# Patient Record
Sex: FEMALE | Race: WHITE | URBAN - METROPOLITAN AREA
[De-identification: names, ages, dates, MRNs, and addresses within clinical notes are randomized per-mention and may not be internally consistent; named-entity substitution may affect disease eponyms.]

---

## 2020-08-10 ENCOUNTER — LAB OUTSIDE AN ENCOUNTER (OUTPATIENT)
Dept: URBAN - METROPOLITAN AREA CLINIC 74 | Facility: CLINIC | Age: 60
End: 2020-08-10

## 2020-08-10 ENCOUNTER — OFFICE VISIT (OUTPATIENT)
Dept: URBAN - METROPOLITAN AREA CLINIC 74 | Facility: CLINIC | Age: 60
End: 2020-08-10
Payer: MEDICARE

## 2020-08-10 DIAGNOSIS — K59.09 COLONIC CONSTIPATION: ICD-10-CM

## 2020-08-10 DIAGNOSIS — Z98.890 HISTORY OF NISSEN FUNDOPLICATION: ICD-10-CM

## 2020-08-10 DIAGNOSIS — K21.9 GASTROESOPHAGEAL REFLUX DISEASE WITHOUT ESOPHAGITIS: ICD-10-CM

## 2020-08-10 DIAGNOSIS — K22.4 ESOPHAGEAL DYSMOTILITY: ICD-10-CM

## 2020-08-10 PROCEDURE — 1036F TOBACCO NON-USER: CPT | Performed by: INTERNAL MEDICINE

## 2020-08-10 PROCEDURE — G8419 CALC BMI OUT NRM PARAM NOF/U: HCPCS | Performed by: INTERNAL MEDICINE

## 2020-08-10 PROCEDURE — 3017F COLORECTAL CA SCREEN DOC REV: CPT | Performed by: INTERNAL MEDICINE

## 2020-08-10 PROCEDURE — 99213 OFFICE O/P EST LOW 20 MIN: CPT | Performed by: INTERNAL MEDICINE

## 2020-08-10 PROCEDURE — G9903 PT SCRN TBCO ID AS NON USER: HCPCS | Performed by: INTERNAL MEDICINE

## 2020-08-10 PROCEDURE — G8427 DOCREV CUR MEDS BY ELIG CLIN: HCPCS | Performed by: INTERNAL MEDICINE

## 2020-08-10 RX ORDER — OMEPRAZOLE 40 MG/1
TAKE 1 CAPSULE BY MOUTH TWICE DAILY BEFORE A MEAL CAPSULE, DELAYED RELEASE PELLETS ORAL
OUTPATIENT
Start: 1900-01-01

## 2020-08-10 RX ORDER — HYDROCHLOROTHIAZIDE 25 MG/1
TABLET ORAL
Qty: 0 | Refills: 0 | Status: ACTIVE | COMMUNITY
Start: 1900-01-01

## 2020-08-10 RX ORDER — SERTRALINE 50 MG/1
TABLET, FILM COATED ORAL
Qty: 0 | Refills: 0 | Status: ACTIVE | COMMUNITY
Start: 2018-01-04

## 2020-08-10 RX ORDER — CYCLOBENZAPRINE HYDROCHLORIDE 10 MG/1
TABLET, FILM COATED ORAL
Qty: 0 | Refills: 0 | Status: ACTIVE | COMMUNITY
Start: 1900-01-01

## 2020-08-10 RX ORDER — ROSUVASTATIN CALCIUM 10 MG/1
TAKE 1 TABLET (10 MG) BY ORAL ROUTE ONCE DAILY AT BEDTIME TABLET, FILM COATED ORAL 1
Qty: 0 | Refills: 0 | Status: ACTIVE | COMMUNITY
Start: 1900-01-01

## 2020-08-10 RX ORDER — FLUTICASONE PROPIONATE 50 UG/1
SPRAY, METERED NASAL
Qty: 0 | Refills: 0 | Status: ACTIVE | COMMUNITY
Start: 2018-01-03

## 2020-08-10 RX ORDER — ESTRADIOL 0.5 MG/1
TAKE 1 TABLET (0.5 MG) BY ORAL ROUTE ONCE DAILY TABLET ORAL 1
Qty: 0 | Refills: 0 | Status: ACTIVE | COMMUNITY
Start: 1900-01-01

## 2020-08-10 RX ORDER — ICOSAPENT ETHYL 1000 MG/1
TAKE 2 CAPSULES (2 GRAM) BY ORAL ROUTE 2 TIMES PER DAY WITH FOOD SWALLOWING WHOLE. DO NOT CHEW, OPEN, DISSOLVE AND/OR CRUSH CAPSULE ORAL 2
Qty: 0 | Refills: 0 | Status: ACTIVE | COMMUNITY
Start: 1900-01-01

## 2020-08-10 RX ORDER — GABAPENTIN 800 MG/1
TAKE 1 TABLET (800 MG) BY ORAL ROUTE 3 TIMES PER DAY TABLET, FILM COATED ORAL
Qty: 0 | Refills: 0 | Status: ACTIVE | COMMUNITY
Start: 1900-01-01

## 2020-08-10 RX ORDER — HYDROXYZINE PAMOATE 25 MG/1
CAPSULE ORAL
Qty: 0 | Refills: 0 | Status: ACTIVE | COMMUNITY
Start: 2018-01-04

## 2020-08-10 RX ORDER — CARVEDILOL 6.25 MG/1
TABLET, FILM COATED ORAL
Qty: 0 | Refills: 0 | Status: ACTIVE | COMMUNITY
Start: 1900-01-01

## 2020-08-10 RX ORDER — OMEPRAZOLE 40 MG/1
TAKE 1 CAPSULE BY MOUTH TWICE DAILY BEFORE A MEAL CAPSULE, DELAYED RELEASE PELLETS ORAL
Qty: 180 | Refills: 0 | Status: ACTIVE | COMMUNITY
Start: 1900-01-01

## 2020-08-10 RX ORDER — ALPRAZOLAM 0.5 MG/1
TABLET ORAL
Qty: 0 | Refills: 0 | Status: ACTIVE | COMMUNITY
Start: 1900-01-01

## 2020-08-10 NOTE — HPI-TODAY'S VISIT:
Today August 10, 2020 the patient returns for a follow-up visit, the patient was last seen in the office May 9, 2019 with a history of gastroesophageal reflux without esophagitis, history of Nissen fundoplication, recurrent heartburn, history of a hiatal hernia, history of esophageal dysmotility, history of chronic gastritis, asthma, pulmonary emphysema. The patient had an EGD in 2018 that showed normal esophageal mucosa, a Nissen fundoplication, chronic gastritis.  The patient had a colonoscopy around the same time which showed a 3 mm lymphoid aggregate in the sigmoid colon and internal hemorrhoids.  The patient was advised to have a surveillance colonoscopy in 10 years. The patient has been treated with omeprazole 40 mg twice daily and appeared to be doing well.  The patient was advised to be seen by the nutritionist. Today the patient returns complaining off having significant allergies, allergies are triggering cough and cough is treating her reflux with severe heartburn.  The patient also complains of dysphagia to both liquids and solids.  She denied any chest pain, abdominal pain.  She is taking Prilosec 40 mg twice daily for the heartburn.  The patient agreed to have a barium swallow with liquid barium and a barium tablet as well as an EGD.  Benefits potential complications and alternatives were disclosed.  The patient should follow antireflux measures and diet.

## 2020-08-31 ENCOUNTER — OFFICE VISIT (OUTPATIENT)
Dept: URBAN - METROPOLITAN AREA SURGERY CENTER 30 | Facility: SURGERY CENTER | Age: 60
End: 2020-08-31
Payer: MEDICARE

## 2020-08-31 DIAGNOSIS — R13.19 CERVICAL DYSPHAGIA: ICD-10-CM

## 2020-08-31 DIAGNOSIS — K31.89 ACQUIRED DEFORMITY OF DUODENUM: ICD-10-CM

## 2020-08-31 DIAGNOSIS — K22.8 COLUMNAR-LINED ESOPHAGUS: ICD-10-CM

## 2020-08-31 DIAGNOSIS — R12 BURNING REFLUX: ICD-10-CM

## 2020-08-31 PROCEDURE — G8907 PT DOC NO EVENTS ON DISCHARG: HCPCS | Performed by: INTERNAL MEDICINE

## 2020-08-31 PROCEDURE — 43239 EGD BIOPSY SINGLE/MULTIPLE: CPT | Performed by: INTERNAL MEDICINE

## 2020-08-31 RX ORDER — HYDROCHLOROTHIAZIDE 25 MG/1
TABLET ORAL
Qty: 0 | Refills: 0 | Status: ACTIVE | COMMUNITY
Start: 1900-01-01

## 2020-08-31 RX ORDER — ALPRAZOLAM 0.5 MG/1
TABLET ORAL
Qty: 0 | Refills: 0 | Status: ACTIVE | COMMUNITY
Start: 1900-01-01

## 2020-08-31 RX ORDER — ROSUVASTATIN CALCIUM 10 MG/1
TAKE 1 TABLET (10 MG) BY ORAL ROUTE ONCE DAILY AT BEDTIME TABLET, FILM COATED ORAL 1
Qty: 0 | Refills: 0 | Status: ACTIVE | COMMUNITY
Start: 1900-01-01

## 2020-08-31 RX ORDER — SERTRALINE 50 MG/1
TABLET, FILM COATED ORAL
Qty: 0 | Refills: 0 | Status: ACTIVE | COMMUNITY
Start: 2018-01-04

## 2020-08-31 RX ORDER — GABAPENTIN 800 MG/1
TAKE 1 TABLET (800 MG) BY ORAL ROUTE 3 TIMES PER DAY TABLET, FILM COATED ORAL
Qty: 0 | Refills: 0 | Status: ACTIVE | COMMUNITY
Start: 1900-01-01

## 2020-08-31 RX ORDER — ESTRADIOL 0.5 MG/1
TAKE 1 TABLET (0.5 MG) BY ORAL ROUTE ONCE DAILY TABLET ORAL 1
Qty: 0 | Refills: 0 | Status: ACTIVE | COMMUNITY
Start: 1900-01-01

## 2020-08-31 RX ORDER — FLUTICASONE PROPIONATE 50 UG/1
SPRAY, METERED NASAL
Qty: 0 | Refills: 0 | Status: ACTIVE | COMMUNITY
Start: 2018-01-03

## 2020-08-31 RX ORDER — OMEPRAZOLE 40 MG/1
TAKE 1 CAPSULE BY MOUTH TWICE DAILY BEFORE A MEAL CAPSULE, DELAYED RELEASE PELLETS ORAL
Status: ACTIVE | COMMUNITY
Start: 1900-01-01

## 2020-08-31 RX ORDER — CARVEDILOL 6.25 MG/1
TABLET, FILM COATED ORAL
Qty: 0 | Refills: 0 | Status: ACTIVE | COMMUNITY
Start: 1900-01-01

## 2020-08-31 RX ORDER — HYDROXYZINE PAMOATE 25 MG/1
CAPSULE ORAL
Qty: 0 | Refills: 0 | Status: ACTIVE | COMMUNITY
Start: 2018-01-04

## 2020-08-31 RX ORDER — ICOSAPENT ETHYL 1000 MG/1
TAKE 2 CAPSULES (2 GRAM) BY ORAL ROUTE 2 TIMES PER DAY WITH FOOD SWALLOWING WHOLE. DO NOT CHEW, OPEN, DISSOLVE AND/OR CRUSH CAPSULE ORAL 2
Qty: 0 | Refills: 0 | Status: ACTIVE | COMMUNITY
Start: 1900-01-01

## 2020-08-31 RX ORDER — CYCLOBENZAPRINE HYDROCHLORIDE 10 MG/1
TABLET, FILM COATED ORAL
Qty: 0 | Refills: 0 | Status: ACTIVE | COMMUNITY
Start: 1900-01-01

## 2020-09-17 ENCOUNTER — OFFICE VISIT (OUTPATIENT)
Dept: URBAN - METROPOLITAN AREA CLINIC 74 | Facility: CLINIC | Age: 60
End: 2020-09-17

## 2020-09-17 RX ORDER — ESTRADIOL 0.5 MG/1
TAKE 1 TABLET (0.5 MG) BY ORAL ROUTE ONCE DAILY TABLET ORAL 1
Qty: 0 | Refills: 0 | Status: ACTIVE | COMMUNITY
Start: 1900-01-01

## 2020-09-17 RX ORDER — HYDROXYZINE PAMOATE 25 MG/1
CAPSULE ORAL
Qty: 0 | Refills: 0 | Status: ACTIVE | COMMUNITY
Start: 2018-01-04

## 2020-09-17 RX ORDER — FLUTICASONE PROPIONATE 50 UG/1
SPRAY, METERED NASAL
Qty: 0 | Refills: 0 | Status: ACTIVE | COMMUNITY
Start: 2018-01-03

## 2020-09-17 RX ORDER — OMEPRAZOLE 40 MG/1
TAKE 1 CAPSULE BY MOUTH TWICE DAILY BEFORE A MEAL CAPSULE, DELAYED RELEASE PELLETS ORAL
Status: ACTIVE | COMMUNITY
Start: 1900-01-01

## 2020-09-17 RX ORDER — ALPRAZOLAM 0.5 MG/1
TABLET ORAL
Qty: 0 | Refills: 0 | Status: ACTIVE | COMMUNITY
Start: 1900-01-01

## 2020-09-17 RX ORDER — HYDROCHLOROTHIAZIDE 25 MG/1
TABLET ORAL
Qty: 0 | Refills: 0 | Status: ACTIVE | COMMUNITY
Start: 1900-01-01

## 2020-09-17 RX ORDER — SERTRALINE 50 MG/1
TABLET, FILM COATED ORAL
Qty: 0 | Refills: 0 | Status: ACTIVE | COMMUNITY
Start: 2018-01-04

## 2020-09-17 RX ORDER — CYCLOBENZAPRINE HYDROCHLORIDE 10 MG/1
TABLET, FILM COATED ORAL
Qty: 0 | Refills: 0 | Status: ACTIVE | COMMUNITY
Start: 1900-01-01

## 2020-09-17 RX ORDER — ROSUVASTATIN CALCIUM 10 MG/1
TAKE 1 TABLET (10 MG) BY ORAL ROUTE ONCE DAILY AT BEDTIME TABLET, FILM COATED ORAL 1
Qty: 0 | Refills: 0 | Status: ACTIVE | COMMUNITY
Start: 1900-01-01

## 2020-09-17 RX ORDER — ICOSAPENT ETHYL 1000 MG/1
TAKE 2 CAPSULES (2 GRAM) BY ORAL ROUTE 2 TIMES PER DAY WITH FOOD SWALLOWING WHOLE. DO NOT CHEW, OPEN, DISSOLVE AND/OR CRUSH CAPSULE ORAL 2
Qty: 0 | Refills: 0 | Status: ACTIVE | COMMUNITY
Start: 1900-01-01

## 2020-09-17 RX ORDER — GABAPENTIN 800 MG/1
TAKE 1 TABLET (800 MG) BY ORAL ROUTE 3 TIMES PER DAY TABLET, FILM COATED ORAL
Qty: 0 | Refills: 0 | Status: ACTIVE | COMMUNITY
Start: 1900-01-01

## 2020-09-17 RX ORDER — CARVEDILOL 6.25 MG/1
TABLET, FILM COATED ORAL
Qty: 0 | Refills: 0 | Status: ACTIVE | COMMUNITY
Start: 1900-01-01

## 2020-10-08 ENCOUNTER — OFFICE VISIT (OUTPATIENT)
Dept: URBAN - METROPOLITAN AREA CLINIC 74 | Facility: CLINIC | Age: 60
End: 2020-10-08
Payer: MEDICARE

## 2020-10-08 ENCOUNTER — LAB OUTSIDE AN ENCOUNTER (OUTPATIENT)
Dept: URBAN - METROPOLITAN AREA CLINIC 74 | Facility: CLINIC | Age: 60
End: 2020-10-08

## 2020-10-08 ENCOUNTER — TELEPHONE ENCOUNTER (OUTPATIENT)
Dept: URBAN - METROPOLITAN AREA CLINIC 74 | Facility: CLINIC | Age: 60
End: 2020-10-08

## 2020-10-08 DIAGNOSIS — Z98.890 HISTORY OF NISSEN FUNDOPLICATION: ICD-10-CM

## 2020-10-08 DIAGNOSIS — K22.4 ESOPHAGEAL DYSMOTILITY: ICD-10-CM

## 2020-10-08 DIAGNOSIS — K44.9 HIATAL HERNIA: ICD-10-CM

## 2020-10-08 DIAGNOSIS — Z87.19 HISTORY OF CHRONIC GASTRITIS: ICD-10-CM

## 2020-10-08 DIAGNOSIS — J43.9 PULMONARY EMPHYSEMA, UNSPECIFIED EMPHYSEMA TYPE: ICD-10-CM

## 2020-10-08 DIAGNOSIS — J45.20 INTERMITTENT ASTHMA WITHOUT COMPLICATION, UNSPECIFIED ASTHMA SEVERITY: ICD-10-CM

## 2020-10-08 DIAGNOSIS — K21.00 CHRONIC REFLUX ESOPHAGITIS: ICD-10-CM

## 2020-10-08 DIAGNOSIS — E66.9 OBESITY: ICD-10-CM

## 2020-10-08 DIAGNOSIS — K59.09 COLONIC CONSTIPATION: ICD-10-CM

## 2020-10-08 DIAGNOSIS — R12 HEARTBURN: ICD-10-CM

## 2020-10-08 PROCEDURE — 99213 OFFICE O/P EST LOW 20 MIN: CPT | Performed by: INTERNAL MEDICINE

## 2020-10-08 PROCEDURE — G9908 NO PT TBCO CESS INTERV RNG: HCPCS | Performed by: INTERNAL MEDICINE

## 2020-10-08 PROCEDURE — 3017F COLORECTAL CA SCREEN DOC REV: CPT | Performed by: INTERNAL MEDICINE

## 2020-10-08 PROCEDURE — G8482 FLU IMMUNIZE ORDER/ADMIN: HCPCS | Performed by: INTERNAL MEDICINE

## 2020-10-08 PROCEDURE — G8419 CALC BMI OUT NRM PARAM NOF/U: HCPCS | Performed by: INTERNAL MEDICINE

## 2020-10-08 PROCEDURE — G8427 DOCREV CUR MEDS BY ELIG CLIN: HCPCS | Performed by: INTERNAL MEDICINE

## 2020-10-08 RX ORDER — ICOSAPENT ETHYL 1000 MG/1
TAKE 2 CAPSULES (2 GRAM) BY ORAL ROUTE 2 TIMES PER DAY WITH FOOD SWALLOWING WHOLE. DO NOT CHEW, OPEN, DISSOLVE AND/OR CRUSH CAPSULE ORAL 2
Qty: 0 | Refills: 0 | Status: DISCONTINUED | COMMUNITY
Start: 1900-01-01

## 2020-10-08 RX ORDER — OMEPRAZOLE 40 MG/1
TAKE 1 CAPSULE BY MOUTH TWICE DAILY BEFORE A MEAL CAPSULE, DELAYED RELEASE PELLETS ORAL
OUTPATIENT
Start: 1900-01-01

## 2020-10-08 RX ORDER — ESTRADIOL 0.5 MG/1
TAKE 1 TABLET (0.5 MG) BY ORAL ROUTE ONCE DAILY TABLET ORAL 1
Qty: 0 | Refills: 0 | Status: ACTIVE | COMMUNITY
Start: 1900-01-01

## 2020-10-08 RX ORDER — CYCLOBENZAPRINE HYDROCHLORIDE 10 MG/1
TABLET, FILM COATED ORAL
Qty: 0 | Refills: 0 | Status: ACTIVE | COMMUNITY
Start: 1900-01-01

## 2020-10-08 RX ORDER — ALPRAZOLAM 0.5 MG/1
TABLET ORAL
Qty: 0 | Refills: 0 | Status: ACTIVE | COMMUNITY
Start: 1900-01-01

## 2020-10-08 RX ORDER — HYDROXYZINE PAMOATE 25 MG/1
CAPSULE ORAL
Qty: 0 | Refills: 0 | Status: ACTIVE | COMMUNITY
Start: 2018-01-04

## 2020-10-08 RX ORDER — SERTRALINE 50 MG/1
TABLET, FILM COATED ORAL
Qty: 0 | Refills: 0 | Status: ACTIVE | COMMUNITY
Start: 2018-01-04

## 2020-10-08 RX ORDER — FLUTICASONE PROPIONATE 50 UG/1
SPRAY, METERED NASAL
Qty: 0 | Refills: 0 | Status: ACTIVE | COMMUNITY
Start: 2018-01-03

## 2020-10-08 RX ORDER — CARVEDILOL 6.25 MG/1
TABLET, FILM COATED ORAL
Qty: 0 | Refills: 0 | Status: ACTIVE | COMMUNITY
Start: 1900-01-01

## 2020-10-08 RX ORDER — ROSUVASTATIN CALCIUM 10 MG/1
TAKE 1 TABLET (10 MG) BY ORAL ROUTE ONCE DAILY AT BEDTIME TABLET, FILM COATED ORAL 1
Qty: 0 | Refills: 0 | Status: ACTIVE | COMMUNITY
Start: 1900-01-01

## 2020-10-08 RX ORDER — GABAPENTIN 800 MG/1
TAKE 1 TABLET (800 MG) BY ORAL ROUTE 3 TIMES PER DAY TABLET, FILM COATED ORAL
Qty: 0 | Refills: 0 | Status: ACTIVE | COMMUNITY
Start: 1900-01-01

## 2020-10-08 RX ORDER — OMEPRAZOLE 40 MG/1
TAKE 1 CAPSULE BY MOUTH TWICE DAILY BEFORE A MEAL CAPSULE, DELAYED RELEASE PELLETS ORAL
Status: ACTIVE | COMMUNITY
Start: 1900-01-01

## 2020-10-08 RX ORDER — HYDROCHLOROTHIAZIDE 25 MG/1
TABLET ORAL
Qty: 0 | Refills: 0 | Status: ACTIVE | COMMUNITY
Start: 1900-01-01

## 2020-10-08 NOTE — PHYSICAL EXAM GASTROINTESTINAL
Abdomen , soft, tender roumd mass over the upper abdomen, nondistended , no guarding or rigidity , no masses palpable , normal bowel sounds , Liver and Spleen , no hepatomegaly present , no hepatosplenomegaly , liver nontender , spleen not palpable

## 2020-10-08 NOTE — HPI-TODAY'S VISIT:
Today August 10, 2020 the patient returns for a follow-up visit, the patient was last seen in the office May 9, 2019 with a history of gastroesophageal reflux without esophagitis, history of Nissen fundoplication, recurrent heartburn, history of a hiatal hernia, history of esophageal dysmotility, history of chronic gastritis, asthma, pulmonary emphysema. The patient had an EGD in 2018 that showed normal esophageal mucosa, a Nissen fundoplication, chronic gastritis.  The patient had a colonoscopy around the same time which showed a 3 mm lymphoid aggregate in the sigmoid colon and internal hemorrhoids.  The patient was advised to have a surveillance colonoscopy in 10 years. The patient has been treated with omeprazole 40 mg twice daily and appeared to be doing well.  The patient was advised to be seen by the nutritionist. Today the patient returns complaining off having significant allergies, allergies are triggering cough and cough is treating her reflux with severe heartburn.  The patient also complains of dysphagia to both liquids and solids.  She denied any chest pain, abdominal pain.  She is taking Prilosec 40 mg twice daily for the heartburn.  The patient agreed to have a barium swallow with liquid barium and a barium tablet as well as an EGD.  Benefits potential complications and alternatives were disclosed.  The patient should follow antireflux measures and diet. Today October 8, 2020 the patient returns for a follow-up visit, the patient was last seen in the office August 10, 2020 with gastroesophageal reflux disease with esophagitis, heartburn, history of Nissen fundoplication, history of chronic gastritis, history of hiatal hernia, esophageal dysmotility, colonic constipation, pulmonary emphysema, intermittent asthma and obesity. During the last visit the patient complained of persistent cough. , The patient claimed that the allergies were triggering cough and that her cough was triggered reflux and severe heartburn.  Patient complaint of dysphagia to both liquids and solids.  Patient denied any chest pain, abdominal pain.  The patient was taking Prilosec 40 mg twice daily for heartburn.  The patient agreed to have a barium swallow and an EGD.  A previous EGD in 2018 showed normal esophageal mucosa, Nissen fundoplication, chronic gastritis.  Around the same time the patient had a colonoscopy that showed a 3 mm lymphoid aggregate in the sigmoid colon and internal hemorrhoids. The barium swallow was performed on August 28, 2020 and it showed a moderate hiatal hernia, gastroesophageal reflux and esophageal dysmotility, the gastroesophageal reflux climbed as high as into the proximal third of the esophagus on a recumbent position, the dysmotility created stases. The EGD was performed on August 31, 2020 and it showed a medium-sized hiatal hernia, evidence of a Nissen fundoplication at the EG junction, the wrap appeared to be loose, multiple seeds were found under the wrap.  The patient was found to have distal esophagitis, a moderately tortuous distal esophagus, antral chronic gastritis H. pylori negative and a normal duodenum. Today the patient continues to complain of gastroesophageal reflux and heartburn, the patient has been advised to have an esophageal motility study and return to Dr. Tony Esquivel for reevaluation of the hernia and Nissen fundoplication. The patient has also developed around mass over the supraumbilical area with on palpation appears to be as a large lipoma or postsurgical change, the patient did have a ventral hernia repair with a mesh. The patient will return for a follow-up visit after she gets surgically evaluated and has the motility study done.

## 2020-10-19 ENCOUNTER — OFFICE VISIT (OUTPATIENT)
Dept: URBAN - METROPOLITAN AREA MEDICAL CENTER 28 | Facility: MEDICAL CENTER | Age: 60
End: 2020-10-19
Payer: MEDICARE

## 2020-10-19 DIAGNOSIS — R12 BURNING REFLUX: ICD-10-CM

## 2020-10-19 DIAGNOSIS — K21.9 ACID REFLUX: ICD-10-CM

## 2020-10-19 PROCEDURE — 91010 ESOPHAGUS MOTILITY STUDY: CPT | Performed by: INTERNAL MEDICINE

## 2020-11-05 ENCOUNTER — OFFICE VISIT (OUTPATIENT)
Dept: URBAN - METROPOLITAN AREA CLINIC 74 | Facility: CLINIC | Age: 60
End: 2020-11-05
Payer: MEDICARE

## 2020-11-05 VITALS
BODY MASS INDEX: 29.6 KG/M2 | HEIGHT: 59 IN | TEMPERATURE: 94.5 F | SYSTOLIC BLOOD PRESSURE: 140 MMHG | WEIGHT: 146.8 LBS | DIASTOLIC BLOOD PRESSURE: 82 MMHG | HEART RATE: 98 BPM

## 2020-11-05 DIAGNOSIS — K44.9 PARAESOPHAGEAL HERNIA: ICD-10-CM

## 2020-11-05 DIAGNOSIS — R12 HEARTBURN: ICD-10-CM

## 2020-11-05 DIAGNOSIS — K21.00 GASTRO-ESOPHAGEAL REFLUX DISEASE WITH ESOPHAGITIS, WITHOUT BLEEDING: ICD-10-CM

## 2020-11-05 DIAGNOSIS — R22.2 ABDOMINAL WALL MASS: ICD-10-CM

## 2020-11-05 PROBLEM — 266433003: Status: ACTIVE | Noted: 2020-09-15

## 2020-11-05 PROBLEM — 724388006: Status: ACTIVE | Noted: 2020-11-04

## 2020-11-05 PROCEDURE — 99213 OFFICE O/P EST LOW 20 MIN: CPT | Performed by: INTERNAL MEDICINE

## 2020-11-05 PROCEDURE — G8419 CALC BMI OUT NRM PARAM NOF/U: HCPCS | Performed by: INTERNAL MEDICINE

## 2020-11-05 PROCEDURE — G8482 FLU IMMUNIZE ORDER/ADMIN: HCPCS | Performed by: INTERNAL MEDICINE

## 2020-11-05 PROCEDURE — G9902 PT SCRN TBCO AND ID AS USER: HCPCS | Performed by: INTERNAL MEDICINE

## 2020-11-05 PROCEDURE — G8427 DOCREV CUR MEDS BY ELIG CLIN: HCPCS | Performed by: INTERNAL MEDICINE

## 2020-11-05 PROCEDURE — 3017F COLORECTAL CA SCREEN DOC REV: CPT | Performed by: INTERNAL MEDICINE

## 2020-11-05 RX ORDER — HYDROXYZINE PAMOATE 25 MG/1
CAPSULE ORAL
Qty: 0 | Refills: 0 | Status: ACTIVE | COMMUNITY
Start: 2018-01-04

## 2020-11-05 RX ORDER — OMEPRAZOLE 40 MG/1
TAKE 1 CAPSULE BY MOUTH TWICE DAILY BEFORE A MEAL CAPSULE, DELAYED RELEASE PELLETS ORAL
OUTPATIENT

## 2020-11-05 RX ORDER — OMEPRAZOLE 40 MG/1
TAKE 1 CAPSULE BY MOUTH TWICE DAILY BEFORE A MEAL CAPSULE, DELAYED RELEASE PELLETS ORAL
Status: ACTIVE | COMMUNITY
Start: 1900-01-01

## 2020-11-05 RX ORDER — HYDROCHLOROTHIAZIDE 25 MG/1
TABLET ORAL
Qty: 0 | Refills: 0 | Status: ACTIVE | COMMUNITY
Start: 1900-01-01

## 2020-11-05 RX ORDER — GABAPENTIN 800 MG/1
TAKE 1 TABLET (800 MG) BY ORAL ROUTE 3 TIMES PER DAY TABLET, FILM COATED ORAL
Qty: 0 | Refills: 0 | Status: ACTIVE | COMMUNITY
Start: 1900-01-01

## 2020-11-05 RX ORDER — CYCLOBENZAPRINE HYDROCHLORIDE 10 MG/1
TABLET, FILM COATED ORAL
Qty: 0 | Refills: 0 | Status: ACTIVE | COMMUNITY
Start: 1900-01-01

## 2020-11-05 RX ORDER — ALPRAZOLAM 0.5 MG/1
TABLET ORAL
Qty: 0 | Refills: 0 | Status: ACTIVE | COMMUNITY
Start: 1900-01-01

## 2020-11-05 RX ORDER — ESTRADIOL 0.5 MG/1
TAKE 1 TABLET (0.5 MG) BY ORAL ROUTE ONCE DAILY TABLET ORAL 1
Qty: 0 | Refills: 0 | Status: ACTIVE | COMMUNITY
Start: 1900-01-01

## 2020-11-05 RX ORDER — CARVEDILOL 6.25 MG/1
TABLET, FILM COATED ORAL
Qty: 0 | Refills: 0 | Status: ACTIVE | COMMUNITY
Start: 1900-01-01

## 2020-11-05 RX ORDER — ROSUVASTATIN CALCIUM 10 MG/1
TAKE 1 TABLET (10 MG) BY ORAL ROUTE ONCE DAILY AT BEDTIME TABLET, FILM COATED ORAL 1
Qty: 0 | Refills: 0 | Status: ACTIVE | COMMUNITY
Start: 1900-01-01

## 2020-11-05 RX ORDER — SERTRALINE 50 MG/1
TABLET, FILM COATED ORAL
Qty: 0 | Refills: 0 | Status: ACTIVE | COMMUNITY
Start: 2018-01-04

## 2020-11-05 RX ORDER — FLUTICASONE PROPIONATE 50 UG/1
SPRAY, METERED NASAL
Qty: 0 | Refills: 0 | Status: ACTIVE | COMMUNITY
Start: 2018-01-03

## 2020-11-05 NOTE — HPI-TODAY'S VISIT:
Today August 10, 2020 the patient returns for a follow-up visit, the patient was last seen in the office May 9, 2019 with a history of gastroesophageal reflux without esophagitis, history of Nissen fundoplication, recurrent heartburn, history of a hiatal hernia, history of esophageal dysmotility, history of chronic gastritis, asthma, pulmonary emphysema. The patient had an EGD in 2018 that showed normal esophageal mucosa, a Nissen fundoplication, chronic gastritis.  The patient had a colonoscopy around the same time which showed a 3 mm lymphoid aggregate in the sigmoid colon and internal hemorrhoids.  The patient was advised to have a surveillance colonoscopy in 10 years. The patient has been treated with omeprazole 40 mg twice daily and appeared to be doing well.  The patient was advised to be seen by the nutritionist. Today the patient returns complaining off having significant allergies, allergies are triggering cough and cough is treating her reflux with severe heartburn.  The patient also complains of dysphagia to both liquids and solids.  She denied any chest pain, abdominal pain.  She is taking Prilosec 40 mg twice daily for the heartburn.  The patient agreed to have a barium swallow with liquid barium and a barium tablet as well as an EGD.  Benefits potential complications and alternatives were disclosed.  The patient should follow antireflux measures and diet. Today October 8, 2020 the patient returns for a follow-up visit, the patient was last seen in the office August 10, 2020 with gastroesophageal reflux disease with esophagitis, heartburn, history of Nissen fundoplication, history of chronic gastritis, history of hiatal hernia, esophageal dysmotility, colonic constipation, pulmonary emphysema, intermittent asthma and obesity. During the last visit the patient complained of persistent cough. , The patient claimed that the allergies were triggering cough and that her cough was triggered reflux and severe heartburn.  Patient complaint of dysphagia to both liquids and solids.  Patient denied any chest pain, abdominal pain.  The patient was taking Prilosec 40 mg twice daily for heartburn.  The patient agreed to have a barium swallow and an EGD.  A previous EGD in 2018 showed normal esophageal mucosa, Nissen fundoplication, chronic gastritis.  Around the same time the patient had a colonoscopy that showed a 3 mm lymphoid aggregate in the sigmoid colon and internal hemorrhoids. The barium swallow was performed on August 28, 2020 and it showed a moderate hiatal hernia, gastroesophageal reflux and esophageal dysmotility, the gastroesophageal reflux climbed as high as into the proximal third of the esophagus on a recumbent position, the dysmotility created stases. The EGD was performed on August 31, 2020 and it showed a medium-sized hiatal hernia, evidence of a Nissen fundoplication at the EG junction, the wrap appeared to be loose, multiple seeds were found under the wrap.  The patient was found to have distal esophagitis, a moderately tortuous distal esophagus, antral chronic gastritis H. pylori negative and a normal duodenum. Today the patient continues to complain of gastroesophageal reflux and heartburn, the patient has been advised to have an esophageal motility study and return to Dr. Tony Esquivel for reevaluation of the hernia and Nissen fundoplication. The patient has also developed around mass over the supraumbilical area with on palpation appears to be as a large lipoma or postsurgical change, the patient did have a ventral hernia repair with a mesh. The patient will return for a follow-up visit after she gets surgically evaluated and has the motility study done. Today November 5, 2020 the patient returns for a follow-up visit, the patient was last seen in the office on October 8, 2020 with gastroesophageal reflux with esophagitis, a hiatal hernia, heartburn, history of Nissen fundoplication, history of chronic gastritis, esophageal dysmotility, colonic constipation, pulmonary emphysema, intermittent asthma and obesity. At the time of the last visit the patient continued to complain of gastroesophageal reflux and heartburn, the patient was advised to have an esophageal motility study and return to Dr. Tony Esquivel for reevaluation of the of recurrent hiatal hernia post Nissen fundoplication.  The patient had also developed a mass around and above the umbilical area which on palpation appeared to be as a large lipoma or postsurgical scar tissue.  The patient did have a previous ventral hernia repair with a mesh. The esophageal manometry was performed on October 19, 2020, overall the patient had normal esophageal function except for a high and of normal amplitude of the esophageal body contractions. The mean LESP was 26 mmHg with good relaxation, there was a high end of normal relaxation pressure of 9 mmHg normal should be under 8.  The mean distal amplitude of the body was 179 mmHg, normal is up to 180, Bolus transit data showed that the patient moved to 100% of the liquid and 90% of the viscous both normal. The patient was seen by Dr. Esquivel on October 27, 2020, and was advised to have a robotic assisted laparoscopic paraesophageal hernia repair with Nissen fundoplication and mesh reinforcement of the diaphragmatic closure.  The patient was also advised to have a preoperative gallbladder ultrasound and CT of the abdomen to evaluate the palpable mass. Today the patient states that she continues to have gastroesophageal reflux and heartburn even while taking pantoprazole 40 mg twice daily, the patient denied having any dysphagia, odynophagia, nausea or vomiting.  We discussed at length all the above findings, the patient will return to Dr. Esquivel after she has the CT of the abdomen and gallbladder ultrasound. The patient will return for a follow-up visit after she completes the postoperative period.

## 2020-11-05 NOTE — PHYSICAL EXAM GASTROINTESTINAL
Abdomen , soft, nontender, nondistended , no guarding or rigidity , epigastric round hard 5 cmmass palpable , normal bowel sounds , Liver and Spleen , no hepatomegaly present , no hepatosplenomegaly , liver nontender , spleen not palpable

## 2023-09-14 ENCOUNTER — OFFICE VISIT (OUTPATIENT)
Dept: URBAN - METROPOLITAN AREA CLINIC 74 | Facility: CLINIC | Age: 63
End: 2023-09-14
Payer: MEDICARE

## 2023-09-14 ENCOUNTER — WEB ENCOUNTER (OUTPATIENT)
Dept: URBAN - METROPOLITAN AREA CLINIC 74 | Facility: CLINIC | Age: 63
End: 2023-09-14

## 2023-09-14 ENCOUNTER — LAB OUTSIDE AN ENCOUNTER (OUTPATIENT)
Dept: URBAN - METROPOLITAN AREA CLINIC 74 | Facility: CLINIC | Age: 63
End: 2023-09-14

## 2023-09-14 DIAGNOSIS — K22.4 ESOPHAGEAL DYSMOTILITY: ICD-10-CM

## 2023-09-14 DIAGNOSIS — R12 HEARTBURN: ICD-10-CM

## 2023-09-14 DIAGNOSIS — E66.9 OBESITY: ICD-10-CM

## 2023-09-14 DIAGNOSIS — K59.09 COLONIC CONSTIPATION: ICD-10-CM

## 2023-09-14 PROBLEM — 691491000119105: Status: ACTIVE | Noted: 2023-09-14

## 2023-09-14 PROBLEM — 429969003: Status: ACTIVE | Noted: 2023-09-14

## 2023-09-14 PROCEDURE — 99214 OFFICE O/P EST MOD 30 MIN: CPT | Performed by: INTERNAL MEDICINE

## 2023-09-14 RX ORDER — CARVEDILOL 6.25 MG/1
TABLET, FILM COATED ORAL
Qty: 0 | Refills: 0 | Status: ACTIVE | COMMUNITY
Start: 1900-01-01

## 2023-09-14 RX ORDER — ALPRAZOLAM 0.5 MG/1
TABLET ORAL
Qty: 0 | Refills: 0 | Status: ACTIVE | COMMUNITY
Start: 1900-01-01

## 2023-09-14 RX ORDER — OMEPRAZOLE 40 MG/1
TAKE 1 CAPSULE BY MOUTH TWICE DAILY BEFORE A MEAL CAPSULE, DELAYED RELEASE PELLETS ORAL
Status: ACTIVE | COMMUNITY

## 2023-09-14 RX ORDER — SERTRALINE 50 MG/1
TABLET, FILM COATED ORAL
Qty: 0 | Refills: 0 | Status: ACTIVE | COMMUNITY
Start: 2018-01-04

## 2023-09-14 RX ORDER — FLUTICASONE PROPIONATE 50 UG/1
SPRAY, METERED NASAL
Qty: 0 | Refills: 0 | Status: ACTIVE | COMMUNITY
Start: 2018-01-03

## 2023-09-14 RX ORDER — ESTRADIOL 0.5 MG/1
TAKE 1 TABLET (0.5 MG) BY ORAL ROUTE ONCE DAILY TABLET ORAL 1
Qty: 0 | Refills: 0 | Status: ACTIVE | COMMUNITY
Start: 1900-01-01

## 2023-09-14 RX ORDER — CYCLOBENZAPRINE HYDROCHLORIDE 10 MG/1
TABLET, FILM COATED ORAL
Qty: 0 | Refills: 0 | Status: ACTIVE | COMMUNITY
Start: 1900-01-01

## 2023-09-14 RX ORDER — HYDROXYZINE PAMOATE 25 MG/1
CAPSULE ORAL
Qty: 0 | Refills: 0 | Status: ACTIVE | COMMUNITY
Start: 2018-01-04

## 2023-09-14 RX ORDER — GABAPENTIN 800 MG/1
TAKE 1 TABLET (800 MG) BY ORAL ROUTE 3 TIMES PER DAY TABLET, FILM COATED ORAL
Qty: 0 | Refills: 0 | Status: ACTIVE | COMMUNITY
Start: 1900-01-01

## 2023-09-14 RX ORDER — HYDROCHLOROTHIAZIDE 25 MG/1
TABLET ORAL
Qty: 0 | Refills: 0 | Status: ACTIVE | COMMUNITY
Start: 1900-01-01

## 2023-09-14 RX ORDER — ROSUVASTATIN CALCIUM 10 MG/1
TAKE 1 TABLET (10 MG) BY ORAL ROUTE ONCE DAILY AT BEDTIME TABLET, FILM COATED ORAL 1
Qty: 0 | Refills: 0 | Status: ACTIVE | COMMUNITY
Start: 1900-01-01

## 2023-09-14 NOTE — HPI-TODAY'S VISIT:
Today August 10, 2020 the patient returns for a follow-up visit, the patient was last seen in the office May 9, 2019 with a history of gastroesophageal reflux without esophagitis, history of Nissen fundoplication, recurrent heartburn, history of a hiatal hernia, history of esophageal dysmotility, history of chronic gastritis, asthma, pulmonary emphysema. The patient had an EGD in 2018 that showed normal esophageal mucosa, a Nissen fundoplication, chronic gastritis.  The patient had a colonoscopy around the same time which showed a 3 mm lymphoid aggregate in the sigmoid colon and internal hemorrhoids.  The patient was advised to have a surveillance colonoscopy in 10 years. The patient has been treated with omeprazole 40 mg twice daily and appeared to be doing well.  The patient was advised to be seen by the nutritionist. Today the patient returns complaining off having significant allergies, allergies are triggering cough and cough is treating her reflux with severe heartburn.  The patient also complains of dysphagia to both liquids and solids.  She denied any chest pain, abdominal pain.  She is taking Prilosec 40 mg twice daily for the heartburn.  The patient agreed to have a barium swallow with liquid barium and a barium tablet as well as an EGD.  Benefits potential complications and alternatives were disclosed.  The patient should follow antireflux measures and diet. Today October 8, 2020 the patient returns for a follow-up visit, the patient was last seen in the office August 10, 2020 with gastroesophageal reflux disease with esophagitis, heartburn, history of Nissen fundoplication, history of chronic gastritis, history of hiatal hernia, esophageal dysmotility, colonic constipation, pulmonary emphysema, intermittent asthma and obesity. During the last visit the patient complained of persistent cough. , The patient claimed that the allergies were triggering cough and that her cough was triggered reflux and severe heartburn.  Patient complaint of dysphagia to both liquids and solids.  Patient denied any chest pain, abdominal pain.  The patient was taking Prilosec 40 mg twice daily for heartburn.  The patient agreed to have a barium swallow and an EGD.  A previous EGD in 2018 showed normal esophageal mucosa, Nissen fundoplication, chronic gastritis.  Around the same time the patient had a colonoscopy that showed a 3 mm lymphoid aggregate in the sigmoid colon and internal hemorrhoids. The barium swallow was performed on August 28, 2020 and it showed a moderate hiatal hernia, gastroesophageal reflux and esophageal dysmotility, the gastroesophageal reflux climbed as high as into the proximal third of the esophagus on a recumbent position, the dysmotility created stases. The EGD was performed on August 31, 2020 and it showed a medium-sized hiatal hernia, evidence of a Nissen fundoplication at the EG junction, the wrap appeared to be loose, multiple seeds were found under the wrap.  The patient was found to have distal esophagitis, a moderately tortuous distal esophagus, antral chronic gastritis H. pylori negative and a normal duodenum. Today the patient continues to complain of gastroesophageal reflux and heartburn, the patient has been advised to have an esophageal motility study and return to Dr. Tony Esquivel for reevaluation of the hernia and Nissen fundoplication. The patient has also developed around mass over the supraumbilical area with on palpation appears to be as a large lipoma or postsurgical change, the patient did have a ventral hernia repair with a mesh. The patient will return for a follow-up visit after she gets surgically evaluated and has the motility study done. Today November 5, 2020 the patient returns for a follow-up visit, the patient was last seen in the office on October 8, 2020 with gastroesophageal reflux with esophagitis, a hiatal hernia, heartburn, history of Nissen fundoplication, history of chronic gastritis, esophageal dysmotility, colonic constipation, pulmonary emphysema, intermittent asthma and obesity. At the time of the last visit the patient continued to complain of gastroesophageal reflux and heartburn, the patient was advised to have an esophageal motility study and return to Dr. Tony Esquivel for reevaluation of the of recurrent hiatal hernia post Nissen fundoplication.  The patient had also developed a mass around and above the umbilical area which on palpation appeared to be as a large lipoma or postsurgical scar tissue.  The patient did have a previous ventral hernia repair with a mesh. The esophageal manometry was performed on October 19, 2020, overall the patient had normal esophageal function except for a high and of normal amplitude of the esophageal body contractions. The mean LESP was 26 mmHg with good relaxation, there was a high end of normal relaxation pressure of 9 mmHg normal should be under 8.  The mean distal amplitude of the body was 179 mmHg, normal is up to 180, Bolus transit data showed that the patient moved to 100% of the liquid and 90% of the viscous both normal. The patient was seen by Dr. Esquivel on October 27, 2020, and was advised to have a robotic assisted laparoscopic paraesophageal hernia repair with Nissen fundoplication and mesh reinforcement of the diaphragmatic closure.  The patient was also advised to have a preoperative gallbladder ultrasound and CT of the abdomen to evaluate the palpable mass. Today the patient states that she continues to have gastroesophageal reflux and heartburn even while taking pantoprazole 40 mg twice daily, the patient denied having any dysphagia, odynophagia, nausea or vomiting.  We discussed at length all the above findings, the patient will return to Dr. Esquivel after she has the CT of the abdomen and gallbladder ultrasound. The patient will return for a follow-up visit after she completes the postoperative period.  Today's September 14, 2023 the patient returns for a follow-up visit, the patient was last seen on November 5, 2020 with a paraesophageal hernia, GERD with esophagitis, heartburn, history of Nissen fundoplication, history of chronic gastritis, history of hiatal hernia, esophageal dysmotility, colonic constipation, asthma, and abdominal wall mass and obesity.  At the time of the visit the patient was complaining of acid reflux and heartburn even while taking pantoprazole 40 mg twice daily, denies having any dysphagia, odynophagia, nausea or vomiting.  We discussed the symptoms at length the patient was advised to return to see general surgery.  After having the CT scan and gallbladder ultrasound, there is no evidence that she had neither one of the tests.  Today the patient returns to the office stating that she did undergo surgery, Dr. Esquivel performed a hiatal hernia repair and a cholecystectomy for calculus gallbladder disease.  The patient states that she continues to take pantoprazole 40 mg in the a.m. and has been unable to come off the pantoprazole because she gets heartburn.  The patient has also noticed frequent coughing when eating with some oropharyngeal dysphagia.  There has been no nausea vomiting no hematemesis or melena.  The patient complains of chronic constipation, she defecates type I stools on the [place] scale every 3 to 4 days on, she takes laxatives.  The patient's family history significant for colon polyps on her father and her last colonoscopy was performed in March 2018 and failed to show any adenomatous polyps.  The patient will be scheduled to have a barium swallow with liquid barium and a barium tablet as well as a surveillance colonoscopy.  The patient will take MiraLAX on a daily basis for 5 to 7 days before the procedure and will complete her preparation with a MiraLAX prep.  Benefits potential complications and alternatives to colonoscopy were disclosed.  The patient will return for a follow-up visit after she completes her evaluation.

## 2023-09-18 ENCOUNTER — OFFICE VISIT (OUTPATIENT)
Dept: URBAN - METROPOLITAN AREA SURGERY CENTER 30 | Facility: SURGERY CENTER | Age: 63
End: 2023-09-18

## 2023-09-20 ENCOUNTER — TELEPHONE ENCOUNTER (OUTPATIENT)
Dept: URBAN - METROPOLITAN AREA CLINIC 74 | Facility: CLINIC | Age: 63
End: 2023-09-20

## 2023-09-26 ENCOUNTER — OUT OF OFFICE VISIT (OUTPATIENT)
Dept: URBAN - METROPOLITAN AREA SURGERY CENTER 30 | Facility: SURGERY CENTER | Age: 63
End: 2023-09-26
Payer: MEDICARE

## 2023-09-26 DIAGNOSIS — Z86.010 ADENOMAS PERSONAL HISTORY OF COLONIC POLYPS: ICD-10-CM

## 2023-09-26 DIAGNOSIS — K64.8 OTHER HEMORRHOIDS: ICD-10-CM

## 2023-09-26 DIAGNOSIS — K57.30 DIVERTICULA OF COLON: ICD-10-CM

## 2023-09-26 PROCEDURE — 00811 ANES LWR INTST NDSC NOS: CPT | Performed by: NURSE ANESTHETIST, CERTIFIED REGISTERED

## 2023-09-26 PROCEDURE — G0105 COLORECTAL SCRN; HI RISK IND: HCPCS | Performed by: INTERNAL MEDICINE

## 2023-09-26 PROCEDURE — G8907 PT DOC NO EVENTS ON DISCHARG: HCPCS | Performed by: INTERNAL MEDICINE

## 2023-09-26 RX ORDER — GABAPENTIN 800 MG/1
TAKE 1 TABLET (800 MG) BY ORAL ROUTE 3 TIMES PER DAY TABLET, FILM COATED ORAL
Qty: 0 | Refills: 0 | Status: ACTIVE | COMMUNITY
Start: 1900-01-01

## 2023-09-26 RX ORDER — SERTRALINE 50 MG/1
TABLET, FILM COATED ORAL
Qty: 0 | Refills: 0 | Status: ACTIVE | COMMUNITY
Start: 2018-01-04

## 2023-09-26 RX ORDER — OMEPRAZOLE 40 MG/1
TAKE 1 CAPSULE BY MOUTH TWICE DAILY BEFORE A MEAL CAPSULE, DELAYED RELEASE PELLETS ORAL
Status: ACTIVE | COMMUNITY

## 2023-09-26 RX ORDER — ESTRADIOL 0.5 MG/1
TAKE 1 TABLET (0.5 MG) BY ORAL ROUTE ONCE DAILY TABLET ORAL 1
Qty: 0 | Refills: 0 | Status: ACTIVE | COMMUNITY
Start: 1900-01-01

## 2023-09-26 RX ORDER — FLUTICASONE PROPIONATE 50 UG/1
SPRAY, METERED NASAL
Qty: 0 | Refills: 0 | Status: ACTIVE | COMMUNITY
Start: 2018-01-03

## 2023-09-26 RX ORDER — HYDROCHLOROTHIAZIDE 25 MG/1
TABLET ORAL
Qty: 0 | Refills: 0 | Status: ACTIVE | COMMUNITY
Start: 1900-01-01

## 2023-09-26 RX ORDER — CARVEDILOL 6.25 MG/1
TABLET, FILM COATED ORAL
Qty: 0 | Refills: 0 | Status: ACTIVE | COMMUNITY
Start: 1900-01-01

## 2023-09-26 RX ORDER — HYDROXYZINE PAMOATE 25 MG/1
CAPSULE ORAL
Qty: 0 | Refills: 0 | Status: ACTIVE | COMMUNITY
Start: 2018-01-04

## 2023-09-26 RX ORDER — ALPRAZOLAM 0.5 MG/1
TABLET ORAL
Qty: 0 | Refills: 0 | Status: ACTIVE | COMMUNITY
Start: 1900-01-01

## 2023-09-26 RX ORDER — ROSUVASTATIN CALCIUM 10 MG/1
TAKE 1 TABLET (10 MG) BY ORAL ROUTE ONCE DAILY AT BEDTIME TABLET, FILM COATED ORAL 1
Qty: 0 | Refills: 0 | Status: ACTIVE | COMMUNITY
Start: 1900-01-01

## 2023-09-26 RX ORDER — CYCLOBENZAPRINE HYDROCHLORIDE 10 MG/1
TABLET, FILM COATED ORAL
Qty: 0 | Refills: 0 | Status: ACTIVE | COMMUNITY
Start: 1900-01-01

## 2023-10-04 ENCOUNTER — TELEPHONE ENCOUNTER (OUTPATIENT)
Dept: URBAN - METROPOLITAN AREA CLINIC 74 | Facility: CLINIC | Age: 63
End: 2023-10-04

## 2023-11-02 ENCOUNTER — TELEPHONE ENCOUNTER (OUTPATIENT)
Dept: URBAN - METROPOLITAN AREA CLINIC 3 | Facility: CLINIC | Age: 63
End: 2023-11-02

## 2023-11-16 ENCOUNTER — OFFICE VISIT (OUTPATIENT)
Dept: URBAN - METROPOLITAN AREA CLINIC 74 | Facility: CLINIC | Age: 63
End: 2023-11-16

## 2023-11-27 PROBLEM — 235595009: Status: ACTIVE | Noted: 2023-11-27

## 2023-11-27 PROBLEM — 733657002: Status: ACTIVE | Noted: 2023-11-27

## 2023-11-29 ENCOUNTER — CLAIMS CREATED FROM THE CLAIM WINDOW (OUTPATIENT)
Dept: URBAN - METROPOLITAN AREA CLINIC 74 | Facility: CLINIC | Age: 63
End: 2023-11-29
Payer: MEDICARE

## 2023-11-29 ENCOUNTER — DASHBOARD ENCOUNTERS (OUTPATIENT)
Age: 63
End: 2023-11-29

## 2023-11-29 VITALS
TEMPERATURE: 97.5 F | WEIGHT: 142 LBS | SYSTOLIC BLOOD PRESSURE: 126 MMHG | HEIGHT: 59 IN | BODY MASS INDEX: 28.63 KG/M2 | HEART RATE: 87 BPM | DIASTOLIC BLOOD PRESSURE: 80 MMHG | OXYGEN SATURATION: 94 %

## 2023-11-29 DIAGNOSIS — R12 HEARTBURN: ICD-10-CM

## 2023-11-29 DIAGNOSIS — K21.9 ACID REFLUX: ICD-10-CM

## 2023-11-29 DIAGNOSIS — K64.8 INTERNAL HEMORRHOIDS: ICD-10-CM

## 2023-11-29 DIAGNOSIS — Z87.19 HISTORY OF CHRONIC GASTRITIS: ICD-10-CM

## 2023-11-29 DIAGNOSIS — Z87.19 HISTORY OF ESOPHAGITIS: ICD-10-CM

## 2023-11-29 DIAGNOSIS — K21.9 GASTROESOPHAGEAL REFLUX DISEASE, UNSPECIFIED WHETHER ESOPHAGITIS PRESENT: ICD-10-CM

## 2023-11-29 DIAGNOSIS — Z87.19 HISTORY OF HIATAL HERNIA: ICD-10-CM

## 2023-11-29 DIAGNOSIS — J43.9 PULMONARY EMPHYSEMA, UNSPECIFIED EMPHYSEMA TYPE: ICD-10-CM

## 2023-11-29 DIAGNOSIS — K57.30 COLON, DIVERTICULOSIS: ICD-10-CM

## 2023-11-29 DIAGNOSIS — J45.20 INTERMITTENT ASTHMA WITHOUT COMPLICATION, UNSPECIFIED ASTHMA SEVERITY: ICD-10-CM

## 2023-11-29 DIAGNOSIS — Z83.71 FAMILY HISTORY OF COLONIC POLYPS: ICD-10-CM

## 2023-11-29 DIAGNOSIS — E66.9 OBESITY: ICD-10-CM

## 2023-11-29 DIAGNOSIS — Z98.890 HISTORY OF NISSEN FUNDOPLICATION: ICD-10-CM

## 2023-11-29 DIAGNOSIS — K22.4 ESOPHAGEAL DYSMOTILITY: ICD-10-CM

## 2023-11-29 DIAGNOSIS — K59.09 COLONIC CONSTIPATION: ICD-10-CM

## 2023-11-29 DIAGNOSIS — K44.9 HIATAL HERNIA: ICD-10-CM

## 2023-11-29 PROCEDURE — 99213 OFFICE O/P EST LOW 20 MIN: CPT | Performed by: INTERNAL MEDICINE

## 2023-11-29 RX ORDER — FLUTICASONE PROPIONATE 50 UG/1
SPRAY, METERED NASAL
Qty: 0 | Refills: 0 | Status: ACTIVE | COMMUNITY
Start: 2018-01-03

## 2023-11-29 RX ORDER — HYDROCODONE BITARTRATE AND ACETAMINOPHEN 5; 325 MG/1; MG/1
1 TABLET AS NEEDED TABLET ORAL
Status: ACTIVE | COMMUNITY

## 2023-11-29 RX ORDER — OMEPRAZOLE 40 MG/1
TAKE 1 CAPSULE BY MOUTH TWICE DAILY BEFORE A MEAL CAPSULE, DELAYED RELEASE PELLETS ORAL
Status: ACTIVE | COMMUNITY

## 2023-11-29 RX ORDER — OMEPRAZOLE 40 MG/1
TAKE 1 CAPSULE BY MOUTH TWICE DAILY BEFORE A MEAL CAPSULE, DELAYED RELEASE PELLETS ORAL
OUTPATIENT

## 2023-11-29 RX ORDER — HYDROCHLOROTHIAZIDE 25 MG/1
TABLET ORAL
Qty: 0 | Refills: 0 | Status: ACTIVE | COMMUNITY
Start: 1900-01-01

## 2023-11-29 RX ORDER — CARVEDILOL 6.25 MG/1
TABLET, FILM COATED ORAL
Qty: 0 | Refills: 0 | Status: ACTIVE | COMMUNITY
Start: 1900-01-01

## 2023-11-29 RX ORDER — ROSUVASTATIN CALCIUM 10 MG/1
TAKE 1 TABLET (10 MG) BY ORAL ROUTE ONCE DAILY AT BEDTIME TABLET, FILM COATED ORAL 1
Qty: 0 | Refills: 0 | Status: ACTIVE | COMMUNITY
Start: 1900-01-01

## 2023-11-29 RX ORDER — GABAPENTIN 800 MG/1
TAKE 1 TABLET (800 MG) BY ORAL ROUTE 3 TIMES PER DAY TABLET, FILM COATED ORAL
Qty: 0 | Refills: 0 | Status: ACTIVE | COMMUNITY
Start: 1900-01-01

## 2023-11-29 RX ORDER — CYCLOBENZAPRINE HYDROCHLORIDE 10 MG/1
TABLET, FILM COATED ORAL
Qty: 0 | Refills: 0 | Status: ACTIVE | COMMUNITY
Start: 1900-01-01

## 2023-11-29 RX ORDER — SERTRALINE 50 MG/1
TABLET, FILM COATED ORAL
Qty: 0 | Refills: 0 | Status: ACTIVE | COMMUNITY
Start: 2018-01-04

## 2023-11-29 RX ORDER — ESTRADIOL 0.5 MG/1
TAKE 1 TABLET (0.5 MG) BY ORAL ROUTE ONCE DAILY TABLET ORAL 1
Qty: 0 | Refills: 0 | Status: ACTIVE | COMMUNITY
Start: 1900-01-01

## 2023-11-29 RX ORDER — HYDROXYZINE PAMOATE 25 MG/1
CAPSULE ORAL
Qty: 0 | Refills: 0 | Status: ACTIVE | COMMUNITY
Start: 2018-01-04

## 2023-11-29 RX ORDER — ALPRAZOLAM 0.5 MG/1
TABLET ORAL
Qty: 0 | Refills: 0 | Status: ACTIVE | COMMUNITY
Start: 1900-01-01

## 2023-11-29 NOTE — HPI-TODAY'S VISIT:
Today August 10, 2020 the patient returns for a follow-up visit, the patient was last seen in the office May 9, 2019 with a history of gastroesophageal reflux without esophagitis, history of Nissen fundoplication, recurrent heartburn, history of a hiatal hernia, history of esophageal dysmotility, history of chronic gastritis, asthma, pulmonary emphysema. The patient had an EGD in 2018 that showed normal esophageal mucosa, a Nissen fundoplication, chronic gastritis.  The patient had a colonoscopy around the same time which showed a 3 mm lymphoid aggregate in the sigmoid colon and internal hemorrhoids.  The patient was advised to have a surveillance colonoscopy in 10 years. The patient has been treated with omeprazole 40 mg twice daily and appeared to be doing well.  The patient was advised to be seen by the nutritionist. Today the patient returns complaining off having significant allergies, allergies are triggering cough and cough is treating her reflux with severe heartburn.  The patient also complains of dysphagia to both liquids and solids.  She denied any chest pain, abdominal pain.  She is taking Prilosec 40 mg twice daily for the heartburn.  The patient agreed to have a barium swallow with liquid barium and a barium tablet as well as an EGD.  Benefits potential complications and alternatives were disclosed.  The patient should follow antireflux measures and diet. Today October 8, 2020 the patient returns for a follow-up visit, the patient was last seen in the office August 10, 2020 with gastroesophageal reflux disease with esophagitis, heartburn, history of Nissen fundoplication, history of chronic gastritis, history of hiatal hernia, esophageal dysmotility, colonic constipation, pulmonary emphysema, intermittent asthma and obesity. During the last visit the patient complained of persistent cough. , The patient claimed that the allergies were triggering cough and that her cough was triggered reflux and severe heartburn.  Patient complaint of dysphagia to both liquids and solids.  Patient denied any chest pain, abdominal pain.  The patient was taking Prilosec 40 mg twice daily for heartburn.  The patient agreed to have a barium swallow and an EGD.  A previous EGD in 2018 showed normal esophageal mucosa, Nissen fundoplication, chronic gastritis.  Around the same time the patient had a colonoscopy that showed a 3 mm lymphoid aggregate in the sigmoid colon and internal hemorrhoids. The barium swallow was performed on August 28, 2020 and it showed a moderate hiatal hernia, gastroesophageal reflux and esophageal dysmotility, the gastroesophageal reflux climbed as high as into the proximal third of the esophagus on a recumbent position, the dysmotility created stases. The EGD was performed on August 31, 2020 and it showed a medium-sized hiatal hernia, evidence of a Nissen fundoplication at the EG junction, the wrap appeared to be loose, multiple seeds were found under the wrap.  The patient was found to have distal esophagitis, a moderately tortuous distal esophagus, antral chronic gastritis H. pylori negative and a normal duodenum. Today the patient continues to complain of gastroesophageal reflux and heartburn, the patient has been advised to have an esophageal motility study and return to Dr. Tony Esquivel for reevaluation of the hernia and Nissen fundoplication. The patient has also developed around mass over the supraumbilical area with on palpation appears to be as a large lipoma or postsurgical change, the patient did have a ventral hernia repair with a mesh. The patient will return for a follow-up visit after she gets surgically evaluated and has the motility study done. Today November 5, 2020 the patient returns for a follow-up visit, the patient was last seen in the office on October 8, 2020 with gastroesophageal reflux with esophagitis, a hiatal hernia, heartburn, history of Nissen fundoplication, history of chronic gastritis, esophageal dysmotility, colonic constipation, pulmonary emphysema, intermittent asthma and obesity. At the time of the last visit the patient continued to complain of gastroesophageal reflux and heartburn, the patient was advised to have an esophageal motility study and return to Dr. Tony Esquivel for reevaluation of the of recurrent hiatal hernia post Nissen fundoplication.  The patient had also developed a mass around and above the umbilical area which on palpation appeared to be as a large lipoma or postsurgical scar tissue.  The patient did have a previous ventral hernia repair with a mesh. The esophageal manometry was performed on October 19, 2020, overall the patient had normal esophageal function except for a high and of normal amplitude of the esophageal body contractions. The mean LESP was 26 mmHg with good relaxation, there was a high end of normal relaxation pressure of 9 mmHg normal should be under 8.  The mean distal amplitude of the body was 179 mmHg, normal is up to 180, Bolus transit data showed that the patient moved to 100% of the liquid and 90% of the viscous both normal. The patient was seen by Dr. Esquivel on October 27, 2020, and was advised to have a robotic assisted laparoscopic paraesophageal hernia repair with Nissen fundoplication and mesh reinforcement of the diaphragmatic closure.  The patient was also advised to have a preoperative gallbladder ultrasound and CT of the abdomen to evaluate the palpable mass. Today the patient states that she continues to have gastroesophageal reflux and heartburn even while taking pantoprazole 40 mg twice daily, the patient denied having any dysphagia, odynophagia, nausea or vomiting.  We discussed at length all the above findings, the patient will return to Dr. Esquivel after she has the CT of the abdomen and gallbladder ultrasound. The patient will return for a follow-up visit after she completes the postoperative period.  Today's September 14, 2023 the patient returns for a follow-up visit, the patient was last seen on November 5, 2020 with a paraesophageal hernia, GERD with esophagitis, heartburn, history of Nissen fundoplication, history of chronic gastritis, history of hiatal hernia, esophageal dysmotility, colonic constipation, asthma, and abdominal wall mass and obesity.  At the time of the visit the patient was complaining of acid reflux and heartburn even while taking pantoprazole 40 mg twice daily, denies having any dysphagia, odynophagia, nausea or vomiting.  We discussed the symptoms at length the patient was advised to return to see general surgery.  After having the CT scan and gallbladder ultrasound, there is no evidence that she had neither one of the tests.  Today the patient returns to the office stating that she did undergo surgery, Dr. Esquivel performed a hiatal hernia repair and a cholecystectomy for calculus gallbladder disease.  The patient states that she continues to take pantoprazole 40 mg in the a.m. and has been unable to come off the pantoprazole because she gets heartburn.  The patient has also noticed frequent coughing when eating with some oropharyngeal dysphagia.  There has been no nausea vomiting no hematemesis or melena.  The patient complains of chronic constipation, she defecates type I stools on the Warren scale every 3 to 4 days on, she takes laxatives.  The patient's family history significant for colon polyps on her father and her last colonoscopy was performed in March 2018 and failed to show any adenomatous polyps.  The patient will be scheduled to have a barium swallow with liquid barium and a barium tablet as well as a surveillance colonoscopy.  The patient will take MiraLAX on a daily basis for 5 to 7 days before the procedure and will complete her preparation with a MiraLAX prep.  Benefits potential complications and alternatives to colonoscopy were disclosed.  The patient will return for a follow-up visit after she completes her evaluation.  Today November 29, 2023 the patient returns for a follow-up visit, the patient was last seen on September 14, 2023 with a history of esophagitis, heartburn, history of Nissen fundoplication, history of chronic gastritis, history of hiatal hernia, esophageal dysmotility, colonic constipation, pulmonary emphysema, intermittent asthma, obesity and family history of colonic polyps.  At the time of the visit the patient stated that she had undergone surgery with Dr. Esquivel who performed a hiatal hernia repair and a cholecystectomy for calculus gallbladder disease. The patient remained on pantoprazole 40 mg in the a.m., was unable to come off the pantoprazole because she was getting heartburn.  The patient did notice frequent coughing when eating and developed some oropharyngeal dysphagia.    The patient denied having any nausea, vomiting or hematemesis, there was no melena. The patient did complain of chronic constipation, the patient defecated type I stools on the Warren scale every 3 to 4 days, she was taking laxatives. The family history was significant for colonic polyps on her father, last colonoscopy was performed in March 2018 and it failed to show any adenomatous polyps. The patient was scheduled to have a barium swallow with liquid barium and a barium tablet as well as surveillance colonoscopy.  The patient was to take MiraLAX on a daily basis for 5 to 7 days before the procedure and completed preparation with a full MiraLAX prep. The barium swallow was performed on October 31, 2023 and it revealed normal esophageal peristalsis, there was slight delay in esophageal emptying at the level of the GE junction where an irregular contour was identified with a small herniation of the fundus of the stomach.  This changes were compatible with postsurgical changes, there was no mucosal ulceration or intraluminal filling defect identified.    Upon turning the patient on the supine position it showed a more pronounced appearance of small recurrent herniation of the fundus of the stomach, more prominent delay of esophageal emptying with incidental tertiary peristalsis intermittently demonstrated.    Multiple episodes of gastroesophageal reflux were demonstrated.  Imaging of the stomach and proximal small bowel were unremarkable. The colonoscopy was performed on September 26, 2023, it revealed sigmoid diverticulosis, small nonbleeding internal hemorrhoids, otherwise the colonic mucosa was entirely normal.  The patient was then advised to get a colonoscopy in 5 years.  Today the patient returns to the office stating that she fell and broke her pelvis and is due to have a pelvic repair.She has also seen Dr. Cole and has been reevaluated and will be undergoing the third hiatal hernia repair. I discussed with the patient the recent barium swallow which revealed recurrent herniation of the fundus of the stomach, there was prominent delay of esophageal emptying with incidental intermittent tertiary contractions. The patient had her colonoscopy on September 2023 which revealed sigmoid diverticulosis and internal hemorrhoids, there was no evidence of any colonic mucosal abnormality or polyps and the patient will have a colonoscopy for follow-up purposes in 5 years. The patient continues to have constipation but appears to be drinking little water and not eating enough fiber, it seems that she has mostly outlet dysfunction rather than delayed colonic emptying.  The patient agreed to increase her intake of water and fiber and supplement with Metamucil 1 serving twice daily.  Unfortunately she is not a good candidate for an MRI defecography in view of the multiple medical appliances in the pelvis. The patient will return for a follow-up visit after she has her hiatal hernia repair.

## 2024-11-11 ENCOUNTER — OFFICE VISIT (OUTPATIENT)
Dept: URBAN - METROPOLITAN AREA CLINIC 74 | Facility: CLINIC | Age: 64
End: 2024-11-11
Payer: MEDICARE

## 2024-11-11 ENCOUNTER — LAB OUTSIDE AN ENCOUNTER (OUTPATIENT)
Dept: URBAN - METROPOLITAN AREA CLINIC 74 | Facility: CLINIC | Age: 64
End: 2024-11-11

## 2024-11-11 ENCOUNTER — OFFICE VISIT (OUTPATIENT)
Dept: URBAN - METROPOLITAN AREA CLINIC 74 | Facility: CLINIC | Age: 64
End: 2024-11-11

## 2024-11-11 VITALS
BODY MASS INDEX: 27.46 KG/M2 | HEART RATE: 93 BPM | TEMPERATURE: 98.5 F | WEIGHT: 136.2 LBS | DIASTOLIC BLOOD PRESSURE: 78 MMHG | SYSTOLIC BLOOD PRESSURE: 144 MMHG | HEIGHT: 59 IN

## 2024-11-11 DIAGNOSIS — K76.0 HEPATIC STEATOSIS: ICD-10-CM

## 2024-11-11 DIAGNOSIS — R13.12 OROPHARYNGEAL DYSPHAGIA: ICD-10-CM

## 2024-11-11 DIAGNOSIS — Z98.890 HISTORY OF NISSEN FUNDOPLICATION: ICD-10-CM

## 2024-11-11 DIAGNOSIS — K44.9 HIATAL HERNIA: ICD-10-CM

## 2024-11-11 DIAGNOSIS — R74.01 TRANSAMINITIS: ICD-10-CM

## 2024-11-11 DIAGNOSIS — Z87.19 HISTORY OF CHRONIC GASTRITIS: ICD-10-CM

## 2024-11-11 DIAGNOSIS — Z72.0 TOBACCO USE: ICD-10-CM

## 2024-11-11 DIAGNOSIS — R15.9 FREQUENT FECAL INCONTINENCE: ICD-10-CM

## 2024-11-11 DIAGNOSIS — R93.3 ABNORMAL BARIUM SWALLOW: ICD-10-CM

## 2024-11-11 PROBLEM — 71457002: Status: ACTIVE | Noted: 2024-11-11

## 2024-11-11 PROBLEM — 460671000124103: Status: ACTIVE | Noted: 2024-11-11

## 2024-11-11 PROBLEM — 197321007: Status: ACTIVE | Noted: 2024-11-11

## 2024-11-11 PROCEDURE — 99214 OFFICE O/P EST MOD 30 MIN: CPT | Performed by: PHYSICIAN ASSISTANT

## 2024-11-11 RX ORDER — HYDROCODONE BITARTRATE AND ACETAMINOPHEN 5; 325 MG/1; MG/1
1 TABLET AS NEEDED TABLET ORAL
Status: ACTIVE | COMMUNITY

## 2024-11-11 RX ORDER — SENNOSIDES 8.6 MG
2 TABLETS AT BEDTIME AS NEEDED TABLET ORAL ONCE A DAY
Status: ACTIVE | COMMUNITY

## 2024-11-11 RX ORDER — OMEPRAZOLE 40 MG/1
TAKE 1 CAPSULE BY MOUTH TWICE DAILY BEFORE A MEAL CAPSULE, DELAYED RELEASE PELLETS ORAL
Status: ACTIVE | COMMUNITY

## 2024-11-11 RX ORDER — FLUTICASONE PROPIONATE 50 UG/1
SPRAY, METERED NASAL
Qty: 0 | Refills: 0 | Status: ACTIVE | COMMUNITY
Start: 2018-01-03

## 2024-11-11 RX ORDER — GABAPENTIN 800 MG/1
TAKE 1 TABLET (800 MG) BY ORAL ROUTE 3 TIMES PER DAY TABLET, FILM COATED ORAL
Qty: 0 | Refills: 0 | Status: ACTIVE | COMMUNITY
Start: 1900-01-01

## 2024-11-11 RX ORDER — HYDROCODONE BITARTRATE AND ACETAMINOPHEN 5; 325 MG/1; MG/1
1 TABLET AS NEEDED TABLET ORAL
Status: ON HOLD | COMMUNITY

## 2024-11-11 RX ORDER — SERTRALINE 50 MG/1
TABLET, FILM COATED ORAL
Qty: 0 | Refills: 0 | Status: ON HOLD | COMMUNITY
Start: 2018-01-04

## 2024-11-11 RX ORDER — ESTRADIOL 0.5 MG/1
TAKE 1 TABLET (0.5 MG) BY ORAL ROUTE ONCE DAILY TABLET ORAL 1
Qty: 0 | Refills: 0 | Status: ON HOLD | COMMUNITY
Start: 1900-01-01

## 2024-11-11 RX ORDER — HYDROXYZINE PAMOATE 25 MG/1
CAPSULE ORAL
Qty: 0 | Refills: 0 | Status: ACTIVE | COMMUNITY
Start: 2018-01-04

## 2024-11-11 RX ORDER — HYDROCHLOROTHIAZIDE 25 MG/1
TABLET ORAL
Qty: 0 | Refills: 0 | Status: ACTIVE | COMMUNITY
Start: 1900-01-01

## 2024-11-11 RX ORDER — ESTRADIOL 0.5 MG/1
TAKE 1 TABLET (0.5 MG) BY ORAL ROUTE ONCE DAILY TABLET ORAL 1
Qty: 0 | Refills: 0 | Status: ACTIVE | COMMUNITY
Start: 1900-01-01

## 2024-11-11 RX ORDER — ALPRAZOLAM 0.5 MG/1
TABLET ORAL
Qty: 0 | Refills: 0 | Status: ACTIVE | COMMUNITY
Start: 1900-01-01

## 2024-11-11 RX ORDER — ROSUVASTATIN CALCIUM 10 MG/1
TAKE 1 TABLET (10 MG) BY ORAL ROUTE ONCE DAILY AT BEDTIME TABLET, FILM COATED ORAL 1
Qty: 0 | Refills: 0 | Status: ACTIVE | COMMUNITY
Start: 1900-01-01

## 2024-11-11 RX ORDER — CARVEDILOL 6.25 MG/1
TABLET, FILM COATED ORAL
Qty: 0 | Refills: 0 | Status: ACTIVE | COMMUNITY
Start: 1900-01-01

## 2024-11-11 RX ORDER — SERTRALINE 50 MG/1
TABLET, FILM COATED ORAL
Qty: 0 | Refills: 0 | Status: ACTIVE | COMMUNITY
Start: 2018-01-04

## 2024-11-11 RX ORDER — CYCLOBENZAPRINE HYDROCHLORIDE 10 MG/1
TABLET, FILM COATED ORAL
Qty: 0 | Refills: 0 | Status: ACTIVE | COMMUNITY
Start: 1900-01-01

## 2024-11-11 RX ORDER — OMEPRAZOLE 40 MG/1
TAKE 1 CAPSULE BY MOUTH TWICE DAILY BEFORE A MEAL CAPSULE, DELAYED RELEASE PELLETS ORAL
OUTPATIENT

## 2024-11-11 NOTE — HPI-TODAY'S VISIT:
The patient is 63-year-old female with past medical history as noted below known to Dr. Norman is presented to our clinic today for follow-up appointment she was last seen in 2023 by Dr. Norman.  She is currently on Omeprazole 40 mg 1 tablet twice daily.The patient is presenting to our clinic today since her last visit with Dr. Norman September 2023 to get her information regarding the surgeon that she was referred last year for hiatal hernia reconstruction.  She stated she has recurrent symptoms of difficulty swallowing solids and liquids.  She also have occasionally difficulty swallowing pills.  She continues with alternating bowel movement diarrhea and constipation with occasional incontinence.  Last EGD in 2020.  And last colonoscopy was in 2023.  Barium swallow study on 10/31/2023 with herniation or partial and wrapping from the prior fundoplication.  More pronounced herniation noted in supine position with abdominal peristalsis.  No evidence of esophageal ulceration or intraluminal filling defect.  Incidental imaging of the stomach and proximal small bowel is unremarkable. The patient was referred to Dr. Dario Cole in 2023.  And she was seen by Dr. Cole in November 2023 for evaluation and repair of hiatal hernia.  She was in the process of being scheduled for robotic hiatal hernia repair with possible mesh implantation.  However she failed to follow-up to schedule her surgery. The patient is S/P Robotic-assisted laparoscopic revision paraesophageal hernia repair with (intact Nissen fundoplication) and gastropexy with laparoscopic cholecystectomy by Dr. Esquivel on 12/30/2020.    Diagnostic studies: -- Labs on 07/09/2024 CMP with BUN 6, creatinine 0.7, , AST 40, ALT 18, and TB 0.2.  CBC with WBC 12.0, hemoglobin 13.3, hematocrit 42.0, and platelet 567.  Lipid panel with cholesterol 159, triglyceride 255, LDL 72, and HDL 36.  TSH 3.6.  -- CT scan of abdomen and pelvis on 06/30/2024 with post surgical changes are seen near the gastroesophageal junction. There is a moderate sized hiatal hernia. No small bowel obstruction. Normal amount of fecal matter seen within the colon. No CT evidence of acute colitis. There is descending/sigmoid colon diverticulosis with no CT evidence of acute diverticulitis. Slightly hypodense appearance of the liver is suggestive of hepatic steatosis. No intrahepatic biliary duct dilatation. Pancreas, spleen, adrenal glands, and Kidneys are unremarkable.   -- BS study on 10/31/2023 with herniation or partial and wrapping from the prior fundoplication. More pronounced herniation noted in supine position with abdominal peristalsis. No evidence of esophageal ulceration or intraluminal filling defect. Incidental imaging of the stomach and proximal small bowel is unremarkable.  Procedures: -- Colonoscopy on 09/26/2023 by Dr. Norman noted diverticulosis in the sigmoid colon.  Non-bleeding internal hemorrhoids.  The examination was otherwise normal.  No specimen collected.  Repeat colonoscopy in 5 years for surveillance.  -- EGD with biopsy on 08/31/2020 by Dr. Norman noted normal examined duodenum.  Erythematous mucosa in antrum.  Medium size hiatal hernia.  A Korey fundoplication was found.  The wrap appears loose.  LA grade A reflux esophagitis.  Tortuous esophagus.  Biopsy of the stomach with chronic inflammation.  No H.pylori organism or intestinal metaplasia.  Esophagus with chronic inflammation.  No Espinosa's esophagus or eosinophilic esophagitis.  -- Esophageal manometry on 10/19/2020 by Dr. Valles noted overall normal esophageal function study except for high end of normal amplitude of the esophageal body contraction.

## 2024-11-11 NOTE — HPI-TODAY'S VISIT:
Today August 10, 2020 the patient returns for a follow-up visit, the patient was last seen in the office May 9, 2019 with a history of gastroesophageal reflux without esophagitis, history of Nissen fundoplication, recurrent heartburn, history of a hiatal hernia, history of esophageal dysmotility, history of chronic gastritis, asthma, pulmonary emphysema. The patient had an EGD in 2018 that showed normal esophageal mucosa, a Nissen fundoplication, chronic gastritis.  The patient had a colonoscopy around the same time which showed a 3 mm lymphoid aggregate in the sigmoid colon and internal hemorrhoids.  The patient was advised to have a surveillance colonoscopy in 10 years. The patient has been treated with omeprazole 40 mg twice daily and appeared to be doing well.  The patient was advised to be seen by the nutritionist. Today the patient returns complaining off having significant allergies, allergies are triggering cough and cough is treating her reflux with severe heartburn.  The patient also complains of dysphagia to both liquids and solids.  She denied any chest pain, abdominal pain.  She is taking Prilosec 40 mg twice daily for the heartburn.  The patient agreed to have a barium swallow with liquid barium and a barium tablet as well as an EGD.  Benefits potential complications and alternatives were disclosed.  The patient should follow antireflux measures and diet. Today October 8, 2020 the patient returns for a follow-up visit, the patient was last seen in the office August 10, 2020 with gastroesophageal reflux disease with esophagitis, heartburn, history of Nissen fundoplication, history of chronic gastritis, history of hiatal hernia, esophageal dysmotility, colonic constipation, pulmonary emphysema, intermittent asthma and obesity. During the last visit the patient complained of persistent cough. , The patient claimed that the allergies were triggering cough and that her cough was triggered reflux and severe heartburn.  Patient complaint of dysphagia to both liquids and solids.  Patient denied any chest pain, abdominal pain.  The patient was taking Prilosec 40 mg twice daily for heartburn.  The patient agreed to have a barium swallow and an EGD.  A previous EGD in 2018 showed normal esophageal mucosa, Nissen fundoplication, chronic gastritis.  Around the same time the patient had a colonoscopy that showed a 3 mm lymphoid aggregate in the sigmoid colon and internal hemorrhoids. The barium swallow was performed on August 28, 2020 and it showed a moderate hiatal hernia, gastroesophageal reflux and esophageal dysmotility, the gastroesophageal reflux climbed as high as into the proximal third of the esophagus on a recumbent position, the dysmotility created stases. The EGD was performed on August 31, 2020 and it showed a medium-sized hiatal hernia, evidence of a Nissen fundoplication at the EG junction, the wrap appeared to be loose, multiple seeds were found under the wrap.  The patient was found to have distal esophagitis, a moderately tortuous distal esophagus, antral chronic gastritis H. pylori negative and a normal duodenum. Today the patient continues to complain of gastroesophageal reflux and heartburn, the patient has been advised to have an esophageal motility study and return to Dr. Tony Esquivel for reevaluation of the hernia and Nissen fundoplication. The patient has also developed around mass over the supraumbilical area with on palpation appears to be as a large lipoma or postsurgical change, the patient did have a ventral hernia repair with a mesh. The patient will return for a follow-up visit after she gets surgically evaluated and has the motility study done. Today November 5, 2020 the patient returns for a follow-up visit, the patient was last seen in the office on October 8, 2020 with gastroesophageal reflux with esophagitis, a hiatal hernia, heartburn, history of Nissen fundoplication, history of chronic gastritis, esophageal dysmotility, colonic constipation, pulmonary emphysema, intermittent asthma and obesity. At the time of the last visit the patient continued to complain of gastroesophageal reflux and heartburn, the patient was advised to have an esophageal motility study and return to Dr. Tony Esquivel for reevaluation of the of recurrent hiatal hernia post Nissen fundoplication.  The patient had also developed a mass around and above the umbilical area which on palpation appeared to be as a large lipoma or postsurgical scar tissue.  The patient did have a previous ventral hernia repair with a mesh. The esophageal manometry was performed on October 19, 2020, overall the patient had normal esophageal function except for a high and of normal amplitude of the esophageal body contractions. The mean LESP was 26 mmHg with good relaxation, there was a high end of normal relaxation pressure of 9 mmHg normal should be under 8.  The mean distal amplitude of the body was 179 mmHg, normal is up to 180, Bolus transit data showed that the patient moved to 100% of the liquid and 90% of the viscous both normal. The patient was seen by Dr. Esquivel on October 27, 2020, and was advised to have a robotic assisted laparoscopic paraesophageal hernia repair with Nissen fundoplication and mesh reinforcement of the diaphragmatic closure.  The patient was also advised to have a preoperative gallbladder ultrasound and CT of the abdomen to evaluate the palpable mass. Today the patient states that she continues to have gastroesophageal reflux and heartburn even while taking pantoprazole 40 mg twice daily, the patient denied having any dysphagia, odynophagia, nausea or vomiting.  We discussed at length all the above findings, the patient will return to Dr. Esquivel after she has the CT of the abdomen and gallbladder ultrasound. The patient will return for a follow-up visit after she completes the postoperative period.  Today's September 14, 2023 the patient returns for a follow-up visit, the patient was last seen on November 5, 2020 with a paraesophageal hernia, GERD with esophagitis, heartburn, history of Nissen fundoplication, history of chronic gastritis, history of hiatal hernia, esophageal dysmotility, colonic constipation, asthma, and abdominal wall mass and obesity.  At the time of the visit the patient was complaining of acid reflux and heartburn even while taking pantoprazole 40 mg twice daily, denies having any dysphagia, odynophagia, nausea or vomiting.  We discussed the symptoms at length the patient was advised to return to see general surgery.  After having the CT scan and gallbladder ultrasound, there is no evidence that she had neither one of the tests.  Today the patient returns to the office stating that she did undergo surgery, Dr. Esquivel performed a hiatal hernia repair and a cholecystectomy for calculus gallbladder disease.  The patient states that she continues to take pantoprazole 40 mg in the a.m. and has been unable to come off the pantoprazole because she gets heartburn.  The patient has also noticed frequent coughing when eating with some oropharyngeal dysphagia.  There has been no nausea vomiting no hematemesis or melena.  The patient complains of chronic constipation, she defecates type I stools on the [place] scale every 3 to 4 days on, she takes laxatives.  The patient's family history significant for colon polyps on her father and her last colonoscopy was performed in March 2018 and failed to show any adenomatous polyps.  The patient will be scheduled to have a barium swallow with liquid barium and a barium tablet as well as a surveillance colonoscopy.  The patient will take MiraLAX on a daily basis for 5 to 7 days before the procedure and will complete her preparation with a MiraLAX prep.  Benefits potential complications and alternatives to colonoscopy were disclosed.  The patient will return for a follow-up visit after she completes her evaluation.  Today November 29, 2023 the patient returns for a follow-up visit, the patient was last seen on September 14, 2023 with a history of esophagitis, heartburn, history of Nissen fundoplication, history of chronic gastritis, history of hiatal hernia, esophageal dysmotility, colonic constipation, pulmonary emphysema, intermittent asthma, obesity and family history of colonic polyps.  At the time of the visit the patient stated that she had undergone surgery with Dr. Esquivel who performed a hiatal hernia repair and a cholecystectomy for calculus gallbladder disease. The patient remained on pantoprazole 40 mg in the a.m., was unable to come off the pantoprazole because she was getting heartburn.  The patient did notice frequent coughing when eating and developed some oropharyngeal dysphagia.    The patient denied having any nausea, vomiting or hematemesis, there was no melena. The patient did complain of chronic constipation, the patient defecated type I stools on the [place] scale every 3 to 4 days, she was taking laxatives. The family history was significant for colonic polyps on her father, last colonoscopy was performed in March 2018 and it failed to show any adenomatous polyps. The patient was scheduled to have a barium swallow with liquid barium and a barium tablet as well as surveillance colonoscopy.  The patient was to take MiraLAX on a daily basis for 5 to 7 days before the procedure and completed preparation with a full MiraLAX prep. The barium swallow was performed on October 31, 2023 and it revealed normal esophageal peristalsis, there was slight delay in esophageal emptying at the level of the GE junction where an irregular contour was identified with a small herniation of the fundus of the stomach.  This changes were compatible with postsurgical changes, there was no mucosal ulceration or intraluminal filling defect identified.    Upon turning the patient on the supine position it showed a more pronounced appearance of small recurrent herniation of the fundus of the stomach, more prominent delay of esophageal emptying with incidental tertiary peristalsis intermittently demonstrated.    Multiple episodes of gastroesophageal reflux were demonstrated.  Imaging of the stomach and proximal small bowel were unremarkable. The colonoscopy was performed on September 26, 2023, it revealed sigmoid diverticulosis, small nonbleeding internal hemorrhoids, otherwise the colonic mucosa was entirely normal.  The patient was then advised to get a colonoscopy in 5 years.  Today the patient returns to the office stating that she fell and broke her pelvis and is due to have a pelvic repair.She has also seen Dr. Cole and has been reevaluated and will be undergoing the third hiatal hernia repair. I discussed with the patient the recent barium swallow which revealed recurrent herniation of the fundus of the stomach, there was prominent delay of esophageal emptying with incidental intermittent tertiary contractions. The patient had her colonoscopy on September 2023 which revealed sigmoid diverticulosis and internal hemorrhoids, there was no evidence of any colonic mucosal abnormality or polyps and the patient will have a colonoscopy for follow-up purposes in 5 years. The patient continues to have constipation but appears to be drinking little water and not eating enough fiber, it seems that she has mostly outlet dysfunction rather than delayed colonic emptying.  The patient agreed to increase her intake of water and fiber and supplement with Metamucil 1 serving twice daily.  Unfortunately she is not a good candidate for an MRI defecography in view of the multiple medical appliances in the pelvis. The patient will return for a follow-up visit after she has her hiatal hernia repair.  Today November 11, 2024 the patient returns for a follow-up visit, the patient was last seen on November 29, 2023 with colonic constipation, colon diverticulosis and internal hemorrhoids, GERD, hiatal hernia, history of esophagitis and heartburn, history of Nissen fundoplication, history of chronic gastritis, history of hiatal hernia, esophageal dysmotility, history of asthma and pulmonary emphysema, obesity and family history of colonic polyps.  At the time of the visit the patient returns after having pelvic surgery postfracture, the patient has been seen by Dr. Cole and was about to undergo a third hiatal hernia repair.  At the time we discussed the most recent barium swallow which revealed recurrent herniation of the fundus of the stomach, there was prominent delay of esophageal emptying with incidental intermittent tertiary contractions.  The patient's colonoscopy performed September 2023 revealed sigmoid diverticulosis and internal hemorrhoids, there was no evidence of any colonic mucosal abnormality or polyp.  At the time the patient was advised to get a colonoscopy in September 2028.  The patient did complain of persistent constipation, was not ingesting enough fiber or drinking water.  The patient appeared to have mostly outlet dysfunction rather than delayed colonic transit.  The patient agreed to initiate some changes including the use of Metamucil 1 serving twice daily and to increase the p.o. intake of water.  The patient was a poor candidate for an MRI defecography, the patient has multiple medical appliances in the pelvis.  The patient was advised to return for a follow-up visit after she had her hiatal hernia repair.

## 2024-11-18 ENCOUNTER — OFFICE VISIT (OUTPATIENT)
Dept: URBAN - METROPOLITAN AREA CLINIC 74 | Facility: CLINIC | Age: 64
End: 2024-11-18

## 2024-12-17 ENCOUNTER — OFFICE VISIT (OUTPATIENT)
Dept: URBAN - METROPOLITAN AREA SURGERY CENTER 30 | Facility: SURGERY CENTER | Age: 64
End: 2024-12-17

## 2025-01-16 ENCOUNTER — OFFICE VISIT (OUTPATIENT)
Dept: URBAN - METROPOLITAN AREA CLINIC 74 | Facility: CLINIC | Age: 65
End: 2025-01-16

## 2025-01-28 ENCOUNTER — OFFICE VISIT (OUTPATIENT)
Dept: URBAN - METROPOLITAN AREA SURGERY CENTER 30 | Facility: SURGERY CENTER | Age: 65
End: 2025-01-28

## 2025-02-11 ENCOUNTER — OFFICE VISIT (OUTPATIENT)
Dept: URBAN - METROPOLITAN AREA SURGERY CENTER 30 | Facility: SURGERY CENTER | Age: 65
End: 2025-02-11
Payer: MEDICARE

## 2025-02-11 ENCOUNTER — CLAIMS CREATED FROM THE CLAIM WINDOW (OUTPATIENT)
Dept: URBAN - METROPOLITAN AREA CLINIC 4 | Facility: CLINIC | Age: 65
End: 2025-02-11
Payer: MEDICARE

## 2025-02-11 DIAGNOSIS — R93.3 ABN FINDINGS-GI TRACT: ICD-10-CM

## 2025-02-11 DIAGNOSIS — R13.19 OTHER DYSPHAGIA: ICD-10-CM

## 2025-02-11 DIAGNOSIS — K31.89 OTHER DISEASES OF STOMACH AND DUODENUM: ICD-10-CM

## 2025-02-11 DIAGNOSIS — Z98.890 OTHER SPECIFIED POSTPROCEDURAL STATES: ICD-10-CM

## 2025-02-11 DIAGNOSIS — K31.7 POLYP OF STOMACH AND DUODENUM: ICD-10-CM

## 2025-02-11 DIAGNOSIS — K44.9 HIATAL HERNIA: ICD-10-CM

## 2025-02-11 PROCEDURE — 43239 EGD BIOPSY SINGLE/MULTIPLE: CPT | Performed by: CLINIC/CENTER

## 2025-02-11 PROCEDURE — 88305 TISSUE EXAM BY PATHOLOGIST: CPT | Performed by: PATHOLOGY

## 2025-02-11 PROCEDURE — 88312 SPECIAL STAINS GROUP 1: CPT | Performed by: PATHOLOGY

## 2025-02-11 PROCEDURE — 43239 EGD BIOPSY SINGLE/MULTIPLE: CPT | Performed by: INTERNAL MEDICINE

## 2025-02-11 PROCEDURE — 00731 ANES UPR GI NDSC PX NOS: CPT | Performed by: NURSE ANESTHETIST, CERTIFIED REGISTERED

## 2025-02-11 RX ORDER — ALPRAZOLAM 0.5 MG/1
TABLET ORAL
Qty: 0 | Refills: 0 | Status: ACTIVE | COMMUNITY
Start: 1900-01-01

## 2025-02-11 RX ORDER — CYCLOBENZAPRINE HYDROCHLORIDE 10 MG/1
TABLET, FILM COATED ORAL
Qty: 0 | Refills: 0 | Status: ACTIVE | COMMUNITY
Start: 1900-01-01

## 2025-02-11 RX ORDER — HYDROXYZINE PAMOATE 25 MG/1
CAPSULE ORAL
Qty: 0 | Refills: 0 | Status: ACTIVE | COMMUNITY
Start: 2018-01-04

## 2025-02-11 RX ORDER — FLUTICASONE PROPIONATE 50 UG/1
SPRAY, METERED NASAL
Qty: 0 | Refills: 0 | Status: ACTIVE | COMMUNITY
Start: 2018-01-03

## 2025-02-11 RX ORDER — CARVEDILOL 6.25 MG/1
TABLET, FILM COATED ORAL
Qty: 0 | Refills: 0 | Status: ACTIVE | COMMUNITY
Start: 1900-01-01

## 2025-02-11 RX ORDER — HYDROCHLOROTHIAZIDE 25 MG/1
TABLET ORAL
Qty: 0 | Refills: 0 | Status: ACTIVE | COMMUNITY
Start: 1900-01-01

## 2025-02-11 RX ORDER — ESTRADIOL 0.5 MG/1
TAKE 1 TABLET (0.5 MG) BY ORAL ROUTE ONCE DAILY TABLET ORAL 1
Qty: 0 | Refills: 0 | Status: ON HOLD | COMMUNITY
Start: 1900-01-01

## 2025-02-11 RX ORDER — ROSUVASTATIN CALCIUM 10 MG/1
TAKE 1 TABLET (10 MG) BY ORAL ROUTE ONCE DAILY AT BEDTIME TABLET, FILM COATED ORAL 1
Qty: 0 | Refills: 0 | Status: ACTIVE | COMMUNITY
Start: 1900-01-01

## 2025-02-11 RX ORDER — OMEPRAZOLE 40 MG/1
TAKE 1 CAPSULE BY MOUTH TWICE DAILY BEFORE A MEAL CAPSULE, DELAYED RELEASE PELLETS ORAL
Status: ACTIVE | COMMUNITY

## 2025-02-11 RX ORDER — HYDROCODONE BITARTRATE AND ACETAMINOPHEN 5; 325 MG/1; MG/1
1 TABLET AS NEEDED TABLET ORAL
Status: ON HOLD | COMMUNITY

## 2025-02-11 RX ORDER — SERTRALINE 50 MG/1
TABLET, FILM COATED ORAL
Qty: 0 | Refills: 0 | Status: ON HOLD | COMMUNITY
Start: 2018-01-04

## 2025-02-11 RX ORDER — SENNOSIDES 8.6 MG
2 TABLETS AT BEDTIME AS NEEDED TABLET ORAL ONCE A DAY
Status: ACTIVE | COMMUNITY

## 2025-02-11 RX ORDER — GABAPENTIN 800 MG/1
TAKE 1 TABLET (800 MG) BY ORAL ROUTE 3 TIMES PER DAY TABLET, FILM COATED ORAL
Qty: 0 | Refills: 0 | Status: ACTIVE | COMMUNITY
Start: 1900-01-01

## 2025-02-27 ENCOUNTER — OFFICE VISIT (OUTPATIENT)
Dept: URBAN - METROPOLITAN AREA CLINIC 74 | Facility: CLINIC | Age: 65
End: 2025-02-27
Payer: MEDICARE

## 2025-02-27 VITALS
SYSTOLIC BLOOD PRESSURE: 162 MMHG | DIASTOLIC BLOOD PRESSURE: 94 MMHG | HEART RATE: 85 BPM | TEMPERATURE: 97.9 F | OXYGEN SATURATION: 95 % | WEIGHT: 127.8 LBS | HEIGHT: 59 IN | BODY MASS INDEX: 25.76 KG/M2

## 2025-02-27 DIAGNOSIS — R93.3 ABNORMAL BARIUM SWALLOW: ICD-10-CM

## 2025-02-27 DIAGNOSIS — K44.9 HIATAL HERNIA: ICD-10-CM

## 2025-02-27 DIAGNOSIS — R15.9 FREQUENT FECAL INCONTINENCE: ICD-10-CM

## 2025-02-27 DIAGNOSIS — R13.12 OROPHARYNGEAL DYSPHAGIA: ICD-10-CM

## 2025-02-27 DIAGNOSIS — Z98.890 HISTORY OF NISSEN FUNDOPLICATION: ICD-10-CM

## 2025-02-27 DIAGNOSIS — Z87.19 HISTORY OF CHRONIC GASTRITIS: ICD-10-CM

## 2025-02-27 DIAGNOSIS — R74.01 TRANSAMINITIS: ICD-10-CM

## 2025-02-27 DIAGNOSIS — K76.0 HEPATIC STEATOSIS: ICD-10-CM

## 2025-02-27 DIAGNOSIS — Z72.0 TOBACCO USE: ICD-10-CM

## 2025-02-27 PROBLEM — 266997008: Status: ACTIVE | Noted: 2025-02-27

## 2025-02-27 PROBLEM — 168824004: Status: ACTIVE | Noted: 2025-02-27

## 2025-02-27 PROBLEM — 365767001: Status: ACTIVE | Noted: 2025-02-27

## 2025-02-27 PROBLEM — 365980008: Status: ACTIVE | Noted: 2025-02-27

## 2025-02-27 PROCEDURE — 99213 OFFICE O/P EST LOW 20 MIN: CPT | Performed by: INTERNAL MEDICINE

## 2025-02-27 RX ORDER — CYCLOBENZAPRINE HYDROCHLORIDE 10 MG/1
TABLET, FILM COATED ORAL
Qty: 0 | Refills: 0 | Status: ACTIVE | COMMUNITY
Start: 1900-01-01

## 2025-02-27 RX ORDER — HYDROCHLOROTHIAZIDE 25 MG/1
TABLET ORAL
Qty: 0 | Refills: 0 | Status: ACTIVE | COMMUNITY
Start: 1900-01-01

## 2025-02-27 RX ORDER — OMEPRAZOLE 40 MG/1
TAKE 1 CAPSULE BY MOUTH TWICE DAILY BEFORE A MEAL CAPSULE, DELAYED RELEASE PELLETS ORAL
Status: ACTIVE | COMMUNITY

## 2025-02-27 RX ORDER — ESTRADIOL 0.5 MG/1
TAKE 1 TABLET (0.5 MG) BY ORAL ROUTE ONCE DAILY TABLET ORAL 1
Qty: 0 | Refills: 0 | Status: ON HOLD | COMMUNITY
Start: 1900-01-01

## 2025-02-27 RX ORDER — OMEPRAZOLE 40 MG/1
TAKE 1 CAPSULE BY MOUTH TWICE DAILY BEFORE A MEAL CAPSULE, DELAYED RELEASE PELLETS ORAL TWICE A DAY
Qty: 180 | Refills: 3

## 2025-02-27 RX ORDER — SERTRALINE 50 MG/1
TABLET, FILM COATED ORAL
Qty: 0 | Refills: 0 | Status: ON HOLD | COMMUNITY
Start: 2018-01-04

## 2025-02-27 RX ORDER — HYDROCODONE BITARTRATE AND ACETAMINOPHEN 5; 325 MG/1; MG/1
1 TABLET AS NEEDED TABLET ORAL
Status: ON HOLD | COMMUNITY

## 2025-02-27 RX ORDER — HYDROXYZINE PAMOATE 25 MG/1
CAPSULE ORAL
Qty: 0 | Refills: 0 | Status: ACTIVE | COMMUNITY
Start: 2018-01-04

## 2025-02-27 RX ORDER — SENNOSIDES 8.6 MG
2 TABLETS AT BEDTIME AS NEEDED TABLET ORAL ONCE A DAY
Status: ACTIVE | COMMUNITY

## 2025-02-27 RX ORDER — GABAPENTIN 800 MG/1
TAKE 1 TABLET (800 MG) BY ORAL ROUTE 3 TIMES PER DAY TABLET, FILM COATED ORAL
Qty: 0 | Refills: 0 | Status: ACTIVE | COMMUNITY
Start: 1900-01-01

## 2025-02-27 RX ORDER — FLUTICASONE PROPIONATE 50 UG/1
SPRAY, METERED NASAL
Qty: 0 | Refills: 0 | Status: ACTIVE | COMMUNITY
Start: 2018-01-03

## 2025-02-27 RX ORDER — ROSUVASTATIN CALCIUM 10 MG/1
TAKE 1 TABLET (10 MG) BY ORAL ROUTE ONCE DAILY AT BEDTIME TABLET, FILM COATED ORAL 1
Qty: 0 | Refills: 0 | Status: ACTIVE | COMMUNITY
Start: 1900-01-01

## 2025-02-27 RX ORDER — CARVEDILOL 6.25 MG/1
TABLET, FILM COATED ORAL
Qty: 0 | Refills: 0 | Status: ACTIVE | COMMUNITY
Start: 1900-01-01

## 2025-02-27 RX ORDER — NAPROXEN 500 MG/1
1 TABLET WITH FOOD OR MILK AS NEEDED TABLET ORAL
Status: ACTIVE | COMMUNITY

## 2025-02-27 RX ORDER — ALPRAZOLAM 0.5 MG/1
TABLET ORAL
Qty: 0 | Refills: 0 | Status: ACTIVE | COMMUNITY
Start: 1900-01-01

## 2025-02-27 NOTE — HPI-TODAY'S VISIT:
The patient is 63-year-old female with past medical history as noted below known to Dr. Norman is presented to our clinic today for follow-up appointment she was last seen in 2023 by Dr. Norman.  She is currently on Omeprazole 40 mg 1 tablet twice daily.The patient is presenting to our clinic today since her last visit with Dr. Norman September 2023 to get her information regarding the surgeon that she was referred last year for hiatal hernia reconstruction.  She stated she has recurrent symptoms of difficulty swallowing solids and liquids.  She also have occasionally difficulty swallowing pills.  She continues with alternating bowel movement diarrhea and constipation with occasional incontinence.  Last EGD in 2020.  And last colonoscopy was in 2023.  Barium swallow study on 10/31/2023 with herniation or partial and wrapping from the prior fundoplication.  More pronounced herniation noted in supine position with abdominal peristalsis.  No evidence of esophageal ulceration or intraluminal filling defect.  Incidental imaging of the stomach and proximal small bowel is unremarkable. The patient was referred to Dr. Dario Cole in 2023.  And she was seen by Dr. Cole in November 2023 for evaluation and repair of hiatal hernia.  She was in the process of being scheduled for robotic hiatal hernia repair with possible mesh implantation.  However she failed to follow-up to schedule her surgery. The patient is S/P Robotic-assisted laparoscopic revision paraesophageal hernia repair with (intact Nissen fundoplication) and gastropexy with laparoscopic cholecystectomy by Dr. Esquivel on 12/30/2020.    Diagnostic studies: -- Labs on 07/09/2024 CMP with BUN 6, creatinine 0.7, , AST 40, ALT 18, and TB 0.2.  CBC with WBC 12.0, hemoglobin 13.3, hematocrit 42.0, and platelet 567.  Lipid panel with cholesterol 159, triglyceride 255, LDL 72, and HDL 36.  TSH 3.6.  -- CT scan of abdomen and pelvis on 06/30/2024 with post surgical changes are seen near the gastroesophageal junction. There is a moderate sized hiatal hernia. No small bowel obstruction. Normal amount of fecal matter seen within the colon. No CT evidence of acute colitis. There is descending/sigmoid colon diverticulosis with no CT evidence of acute diverticulitis. Slightly hypodense appearance of the liver is suggestive of hepatic steatosis. No intrahepatic biliary duct dilatation. Pancreas, spleen, adrenal glands, and Kidneys are unremarkable.   -- BS study on 10/31/2023 with herniation or partial and wrapping from the prior fundoplication. More pronounced herniation noted in supine position with abdominal peristalsis. No evidence of esophageal ulceration or intraluminal filling defect. Incidental imaging of the stomach and proximal small bowel is unremarkable.  Procedures: -- Colonoscopy on 09/26/2023 by Dr. Norman noted diverticulosis in the sigmoid colon.  Non-bleeding internal hemorrhoids.  The examination was otherwise normal.  No specimen collected.  Repeat colonoscopy in 5 years for surveillance.  -- EGD with biopsy on 08/31/2020 by Dr. Norman noted normal examined duodenum.  Erythematous mucosa in antrum.  Medium size hiatal hernia.  A Korey fundoplication was found.  The wrap appears loose.  LA grade A reflux esophagitis.  Tortuous esophagus.  Biopsy of the stomach with chronic inflammation.  No H.pylori organism or intestinal metaplasia.  Esophagus with chronic inflammation.  No Espinosa's esophagus or eosinophilic esophagitis.  -- Esophageal manometry on 10/19/2020 by Dr. Valles noted overall normal esophageal function study except for high end of normal amplitude of the esophageal body contraction.  Today February 27, 2025 the patient returns for a follow-up visit, the patient was last seen on November 11 2024 by Jessica Brooks with a hiatal hernia, history of Nissen fundoplication, history of chronic gastritis, oropharyngeal dysphagia, abnormal barium swallow, hepatic steatosis with elevated transaminases, frequent fecal incontinence and tobacco use.  At the time of the visit the patient returned complaining after having a robotic assisted laparoscopic revision of a paraesophageal hernia repair with intact Nissen fundoplication and gastropexy, laparoscopic cholecystectomy.  The patient complained of recurrent symptoms including dysphagia to solids and liquids, occasionally dysphagia to pills.  The patient also complained of abnormal bowel movements, was alternating diarrhea with constipation and occasional fecal incontinence.  The patient did have a CT scan of the abdomen and pelvis on June 30, 2024, it was performed after surgery, the patient had postsurgical changes near the gastroesophageal junction, there was a moderate size hiatal hernia, there was a normal amount of fecal matter in the colon and no evidence of small bowel obstruction.  There were no inflammatory changes.  The patient had descending and sigmoid colon diverticulosis without evidence of diverticulitis.  The liver was hypodense suggesting fatty liver, no biliary ductal dilation was seen, pancreas, spleen, adrenal glands and kidneys were unremarkable.  A barium swallow on October 31, 2023 revealed a previous fundoplication with herniation.  An EGD on August 31, 2020 revealed a medium sized hiatal hernia, a loose wrap of a Nissen fundoplication, LA grade a reflux esophagitis with a tortuous esophagus, the patient was found to have chronic gastritis, esophageal biopsies failed to show Espinosa's esophagus or eosinophilic esophagitis.  An esophageal manometry on October 19, 2020 appeared to show a normal esophagus except for high and of normal amplitude of the esophageal body contractions.  Patient's last colonoscopy on September 26, 2023 revealed sigmoid diverticulosis and internal hemorrhoids and at the time the patient was advised to get a colonoscopy in September 2028. The patient had an EGD performed on February 11, 2025 and it revealed normal-looking esophagus, a small hiatal hernia, antral erythema and normal duodenal mucosa.  Biopsies revealed proton pump inhibitor effect with changes suggestive of fundic gland polyp and no other significant abnormalities.  Today the patient returns to the office after undergoing an upper endoscopy on February 11, 2025 for dysphagia and a normal barium swallow that revealed a moderate size hiatal hernia with evidence of a small component of gastroesophageal reflux.  The barium tablet advanced to the region without difficulty, the patient did have mild laryngeal penetration with swallowing mechanism without aspiration.  The EGD complicated by an episode of apnea which required ventilation and oxygen, the patient responded well to treatment and the procedure was completed.  The patient was found to have a normal-looking esophagus, a very small hiatal hernia, antral erythema with normal duodenum.  The Nissen fundoplication appeared to be intact.  Biopsies revealed proton pump inhibitor effect with changes suggestive of fundic gland polyp negative for H. pylori or intestinal metaplasia.  The patient has been advised to follow antireflux measures and diet will remain on omeprazole 40 mg twice daily, might supplement with either Maalox Mylanta or Tums if she gets any heartburn.  The patient will return for a follow-up visit in 1 year or as needed.

## 2025-07-18 ENCOUNTER — TELEPHONE ENCOUNTER (OUTPATIENT)
Dept: URBAN - METROPOLITAN AREA CLINIC 40 | Facility: CLINIC | Age: 65
End: 2025-07-18

## 2025-07-18 RX ORDER — ONDANSETRON HYDROCHLORIDE 4 MG/1
1 TABLET EVERY 8 HOURS AS NEEDED TABLET, FILM COATED ORAL AS NEEDED
Qty: 30 | Refills: 0 | OUTPATIENT
Start: 2025-07-18